# Patient Record
Sex: MALE | Race: OTHER | HISPANIC OR LATINO | URBAN - METROPOLITAN AREA
[De-identification: names, ages, dates, MRNs, and addresses within clinical notes are randomized per-mention and may not be internally consistent; named-entity substitution may affect disease eponyms.]

---

## 2019-08-06 ENCOUNTER — INPATIENT (INPATIENT)
Facility: HOSPITAL | Age: 54
LOS: 1 days | Discharge: HOME | DRG: 312 | End: 2019-08-08
Attending: INTERNAL MEDICINE | Admitting: INTERNAL MEDICINE
Payer: COMMERCIAL

## 2019-08-06 VITALS
RESPIRATION RATE: 18 BRPM | DIASTOLIC BLOOD PRESSURE: 77 MMHG | SYSTOLIC BLOOD PRESSURE: 121 MMHG | HEIGHT: 69 IN | TEMPERATURE: 98 F | WEIGHT: 210.1 LBS | HEART RATE: 70 BPM | OXYGEN SATURATION: 98 %

## 2019-08-06 DIAGNOSIS — R55 SYNCOPE AND COLLAPSE: ICD-10-CM

## 2019-08-06 DIAGNOSIS — Z29.9 ENCOUNTER FOR PROPHYLACTIC MEASURES, UNSPECIFIED: ICD-10-CM

## 2019-08-06 LAB
ALBUMIN SERPL ELPH-MCNC: 3.6 G/DL — SIGNIFICANT CHANGE UP (ref 3.5–5)
ALP SERPL-CCNC: 65 U/L — SIGNIFICANT CHANGE UP (ref 40–120)
ALT FLD-CCNC: 47 U/L DA — SIGNIFICANT CHANGE UP (ref 10–60)
ANION GAP SERPL CALC-SCNC: 7 MMOL/L — SIGNIFICANT CHANGE UP (ref 5–17)
APTT BLD: 27.1 SEC — LOW (ref 27.5–36.3)
AST SERPL-CCNC: 34 U/L — SIGNIFICANT CHANGE UP (ref 10–40)
BASOPHILS # BLD AUTO: 0.06 K/UL — SIGNIFICANT CHANGE UP (ref 0–0.2)
BASOPHILS NFR BLD AUTO: 0.6 % — SIGNIFICANT CHANGE UP (ref 0–2)
BILIRUB SERPL-MCNC: 1.2 MG/DL — SIGNIFICANT CHANGE UP (ref 0.2–1.2)
BUN SERPL-MCNC: 12 MG/DL — SIGNIFICANT CHANGE UP (ref 7–18)
CALCIUM SERPL-MCNC: 8.9 MG/DL — SIGNIFICANT CHANGE UP (ref 8.4–10.5)
CHLORIDE SERPL-SCNC: 100 MMOL/L — SIGNIFICANT CHANGE UP (ref 96–108)
CK MB BLD-MCNC: 0.8 % — SIGNIFICANT CHANGE UP (ref 0–3.5)
CK MB BLD-MCNC: 0.8 % — SIGNIFICANT CHANGE UP (ref 0–3.5)
CK MB CFR SERPL CALC: 1.1 NG/ML — SIGNIFICANT CHANGE UP (ref 0–3.6)
CK MB CFR SERPL CALC: 1.3 NG/ML — SIGNIFICANT CHANGE UP (ref 0–3.6)
CK MB CFR SERPL CALC: 1.7 NG/ML — SIGNIFICANT CHANGE UP (ref 0–3.6)
CK SERPL-CCNC: 142 U/L — SIGNIFICANT CHANGE UP (ref 35–232)
CK SERPL-CCNC: 172 U/L — SIGNIFICANT CHANGE UP (ref 35–232)
CO2 SERPL-SCNC: 29 MMOL/L — SIGNIFICANT CHANGE UP (ref 22–31)
CREAT SERPL-MCNC: 1.16 MG/DL — SIGNIFICANT CHANGE UP (ref 0.5–1.3)
D DIMER BLD IA.RAPID-MCNC: <150 NG/ML DDU — SIGNIFICANT CHANGE UP
EOSINOPHIL # BLD AUTO: 0.04 K/UL — SIGNIFICANT CHANGE UP (ref 0–0.5)
EOSINOPHIL NFR BLD AUTO: 0.4 % — SIGNIFICANT CHANGE UP (ref 0–6)
GLUCOSE SERPL-MCNC: 101 MG/DL — HIGH (ref 70–99)
HCT VFR BLD CALC: 46 % — SIGNIFICANT CHANGE UP (ref 39–50)
HGB BLD-MCNC: 15 G/DL — SIGNIFICANT CHANGE UP (ref 13–17)
IMM GRANULOCYTES NFR BLD AUTO: 0.2 % — SIGNIFICANT CHANGE UP (ref 0–1.5)
INR BLD: 0.99 RATIO — SIGNIFICANT CHANGE UP (ref 0.88–1.16)
LYMPHOCYTES # BLD AUTO: 1.58 K/UL — SIGNIFICANT CHANGE UP (ref 1–3.3)
LYMPHOCYTES # BLD AUTO: 16.2 % — SIGNIFICANT CHANGE UP (ref 13–44)
MAGNESIUM SERPL-MCNC: 2.3 MG/DL — SIGNIFICANT CHANGE UP (ref 1.6–2.6)
MCHC RBC-ENTMCNC: 29 PG — SIGNIFICANT CHANGE UP (ref 27–34)
MCHC RBC-ENTMCNC: 32.6 GM/DL — SIGNIFICANT CHANGE UP (ref 32–36)
MCV RBC AUTO: 89 FL — SIGNIFICANT CHANGE UP (ref 80–100)
MONOCYTES # BLD AUTO: 0.89 K/UL — SIGNIFICANT CHANGE UP (ref 0–0.9)
MONOCYTES NFR BLD AUTO: 9.1 % — SIGNIFICANT CHANGE UP (ref 2–14)
NEUTROPHILS # BLD AUTO: 7.18 K/UL — SIGNIFICANT CHANGE UP (ref 1.8–7.4)
NEUTROPHILS NFR BLD AUTO: 73.5 % — SIGNIFICANT CHANGE UP (ref 43–77)
NRBC # BLD: 0 /100 WBCS — SIGNIFICANT CHANGE UP (ref 0–0)
NT-PROBNP SERPL-SCNC: 286 PG/ML — HIGH (ref 0–125)
PLATELET # BLD AUTO: 219 K/UL — SIGNIFICANT CHANGE UP (ref 150–400)
POTASSIUM SERPL-MCNC: 3.8 MMOL/L — SIGNIFICANT CHANGE UP (ref 3.5–5.3)
POTASSIUM SERPL-SCNC: 3.8 MMOL/L — SIGNIFICANT CHANGE UP (ref 3.5–5.3)
PROT SERPL-MCNC: 7.3 G/DL — SIGNIFICANT CHANGE UP (ref 6–8.3)
PROTHROM AB SERPL-ACNC: 11 SEC — SIGNIFICANT CHANGE UP (ref 10–12.9)
RBC # BLD: 5.17 M/UL — SIGNIFICANT CHANGE UP (ref 4.2–5.8)
RBC # FLD: 13 % — SIGNIFICANT CHANGE UP (ref 10.3–14.5)
SODIUM SERPL-SCNC: 136 MMOL/L — SIGNIFICANT CHANGE UP (ref 135–145)
TROPONIN I SERPL-MCNC: <0.015 NG/ML — SIGNIFICANT CHANGE UP (ref 0–0.04)
TSH SERPL-MCNC: 1.17 UU/ML — SIGNIFICANT CHANGE UP (ref 0.34–4.82)
WBC # BLD: 9.77 K/UL — SIGNIFICANT CHANGE UP (ref 3.8–10.5)
WBC # FLD AUTO: 9.77 K/UL — SIGNIFICANT CHANGE UP (ref 3.8–10.5)

## 2019-08-06 PROCEDURE — 99285 EMERGENCY DEPT VISIT HI MDM: CPT

## 2019-08-06 PROCEDURE — 71045 X-RAY EXAM CHEST 1 VIEW: CPT | Mod: 26

## 2019-08-06 RX ORDER — ASPIRIN/CALCIUM CARB/MAGNESIUM 324 MG
81 TABLET ORAL DAILY
Refills: 0 | Status: DISCONTINUED | OUTPATIENT
Start: 2019-08-06 | End: 2019-08-08

## 2019-08-06 RX ORDER — ATORVASTATIN CALCIUM 80 MG/1
40 TABLET, FILM COATED ORAL AT BEDTIME
Refills: 0 | Status: DISCONTINUED | OUTPATIENT
Start: 2019-08-06 | End: 2019-08-08

## 2019-08-06 RX ORDER — ENOXAPARIN SODIUM 100 MG/ML
40 INJECTION SUBCUTANEOUS DAILY
Refills: 0 | Status: DISCONTINUED | OUTPATIENT
Start: 2019-08-06 | End: 2019-08-08

## 2019-08-06 RX ORDER — METOPROLOL TARTRATE 50 MG
12.5 TABLET ORAL
Refills: 0 | Status: DISCONTINUED | OUTPATIENT
Start: 2019-08-06 | End: 2019-08-08

## 2019-08-06 RX ADMIN — ATORVASTATIN CALCIUM 40 MILLIGRAM(S): 80 TABLET, FILM COATED ORAL at 21:39

## 2019-08-06 RX ADMIN — Medication 81 MILLIGRAM(S): at 11:45

## 2019-08-06 RX ADMIN — ENOXAPARIN SODIUM 40 MILLIGRAM(S): 100 INJECTION SUBCUTANEOUS at 11:45

## 2019-08-06 RX ADMIN — Medication 12.5 MILLIGRAM(S): at 18:22

## 2019-08-06 NOTE — ED PROVIDER NOTE - PHYSICAL EXAMINATION
Gen: Alert, NAD  Head: NC, AT   Eyes: PERRL, EOMI, normal lids/conjunctiva  ENT: normal hearing, patent oropharynx without erythema/exudate, uvula midline  Neck: supple, no tenderness, Trachea midline  Pulm: Bilateral BS, normal resp effort, no wheeze/stridor/retractions  CV: RRR, no M/R/G, 2+ radial and dp pulses bl, no edema  Abd: soft, NT/ND, +BS, no hepatosplenomegaly  Mskel: extremities x4 with normal ROM and no joint effusions. no ctl spine ttp.   Skin: minimally diaphoretic   Neuro: AAOx3, no sensory/motor deficits, CN 2-12 intact

## 2019-08-06 NOTE — H&P ADULT - PROBLEM SELECTOR PLAN 2
IMPROVE VTE Individual Risk Assessment  RISK                                                          Points  [] Previous VTE                                           3  [] Thrombophilia                                        2  [] Lower limb paralysis                              2   [] Current Cancer                                       2   [] Immobilization > 24 hrs                        1  [] ICU/CCU stay > 24 hours                       1  [] Age > 60                                                   1  IMPROVE VTE Score =   X for DVT chemoprophylaxis  X for GI chemoprophylaxis IMPROVE VTE Individual Risk Assessment  RISK                                                          Points  [] Previous VTE                                           3  [] Thrombophilia                                        2  [] Lower limb paralysis                              2   [] Current Cancer                                       2   [] Immobilization > 24 hrs                        1  [] ICU/CCU stay > 24 hours                       1  [] Age > 60                                                   1  IMPROVE VTE Score = 1  lovenox 40 mg for DVT chemoprophylaxis

## 2019-08-06 NOTE — ED ADULT NURSE NOTE - NSIMPLEMENTINTERV_GEN_ALL_ED
Implemented All Universal Safety Interventions:  [place] to call system. Call bell, personal items and telephone within reach. Instruct patient to call for assistance. Room bathroom lighting operational. Non-slip footwear when patient is off stretcher. Physically safe environment: no spills, clutter or unnecessary equipment. Stretcher in lowest position, wheels locked, appropriate side rails in place.

## 2019-08-06 NOTE — CONSULT NOTE ADULT - ASSESSMENT
53 years old male with no formal med hx, active smoker, presented with near syncope.  1.Tele monitoring.  2.Echocardiogram.  3.Orthostatic BP q shift.  4.Smoking cessation.  5.Cont asa,statin,b blocker.  6.GI and DVT prophylaxis.

## 2019-08-06 NOTE — ED PROVIDER NOTE - CLINICAL SUMMARY MEDICAL DECISION MAKING FREE TEXT BOX
pt pw syncope. suspect pt has vasovagal episode. however, he is being worked up as an outpatient for myocardial ischemia and was meant to have a test tomorrow. favor admission to expedite work up. I read ekg as sinus ryhthm rate 68 with first degree av block, no pathologic st elevation or depression, no t inversions, q waves inferior, normal qtc and axis.

## 2019-08-06 NOTE — ED PROVIDER NOTE - OBJECTIVE STATEMENT
Pertinent PMH/PSH/FHx/SHx and Review of Systems contained within:  53m no formal med hx, smoker, pw cp and syncope. patient notes that this morning while working on a [employer], he suddenly started feeling dizzy, sweaty, lightheaded, a little short of breath and left sided non rad cp. he had a syncopal vs near syncopal episode. the symptoms resolved on their own. ems called to scene and found him with normal vitals and alert and oriented x3. he was transported to the er. patient presently notes some cp in the left chest but it is not intense. dizziness has resolved. ROS neg for ha, vision loss, rhinorrhea, dysuria, numbness, rash, bleeding, fever, vomiting.

## 2019-08-06 NOTE — H&P ADULT - HISTORY OF PRESENT ILLNESS
53 years old male with no formal med hx, active smoker,, presented with near syncope. Patient notes that this morning while working, he suddenly started feeling dizzy, sweaty, lightheaded, a little short of breath and left sided non radiating chest pain. he had a syncopal vs near syncopal episode which he describes as he was too weak to stand and he slowly lied down to ground and he felt like his blood pressure is low . Patient's symptoms resolved on their own. EMS was called to scene and found him with normal vitals and alert and oriented x3. he was transported to the er. patient presently notes some chest pain in the left chest but it is mild now. pt symptom of dizziness has resolved. patient has an outpatient cardiologist in [place] and was scheduled to have nuclear scan of heart tomorrow. patient has 1 similar episode of syncope before as well. No family history of cardiac disease in family.  patient denies any shortness of breath, palpitations fever, cough, vision loss, rhinorrhea, dysuria, numbness, rash, bleeding, fever, vomiting, change in bowel and urinary habits.

## 2019-08-06 NOTE — H&P ADULT - NSHPPHYSICALEXAM_GEN_ALL_CORE
ICU Vital Signs Last 24 Hrs  T(C): 36.8 (06 Aug 2019 08:45), Max: 36.8 (06 Aug 2019 08:45)  T(F): 98.2 (06 Aug 2019 08:45), Max: 98.2 (06 Aug 2019 08:45)  HR: 70 (06 Aug 2019 08:45) (70 - 70)  BP: 121/77 (06 Aug 2019 08:45) (121/77 - 121/77)  BP(mean): --  ABP: --  ABP(mean): --  RR: 18 (06 Aug 2019 08:45) (18 - 18)  SpO2: 98% (06 Aug 2019 08:45) (98% - 98%)

## 2019-08-06 NOTE — H&P ADULT - ASSESSMENT
53 years old male with no formal med hx, active smoker,, presented with near syncope. Patient notes that this morning while working, he suddenly started feeling dizzy, sweaty, lightheaded, a little short of breath and left sided non radiating chest pain. he had a syncopal vs near syncopal episode which he describes as he was too weak to stand and he slowly lied down to ground and he felt like his blood pressure is low . Patient's symptoms resolved on their own. EMS was called to scene and found him with normal vitals and alert and oriented x3. he was transported to the er. patient presently notes some chest pain in the left chest but it is mild now. pt symptom of dizziness has resolved. patient has an outpatient cardiologist in [place] and was scheduled to have nuclear scan of heart tomorrow. patient has 1 similar episode of syncope before as well. No family history of cardiac disease in family.  patient denies any shortness of breath, palpitations fever, cough, vision loss, rhinorrhea, dysuria, numbness, rash, bleeding, fever, vomiting, change in bowel and urinary habits.  patient is admitted to rule out ACS.

## 2019-08-06 NOTE — CONSULT NOTE ADULT - SUBJECTIVE AND OBJECTIVE BOX
CHIEF COMPLAINT:Patient is a 53y old  Male who presents with a chief complaint of near syncope.       HPI:  53 years old male with no formal med hx, active smoker,, presented with near syncope. Patient notes that this morning while working, he suddenly started feeling dizzy, sweaty, lightheaded, a little short of breath and left sided non radiating chest pain. he had a syncopal vs near syncopal episode which he describes as he was too weak to stand and he slowly lied down to ground and he felt like his blood pressure is low . Patient's symptoms resolved on their own. EMS was called to scene and found him with normal vitals and alert and oriented x3. he was transported to the er. patient presently notes some chest pain in the left chest but it is mild now. pt symptom of dizziness has resolved. patient has an outpatient cardiologist in [place] and was scheduled to have nuclear scan of heart tomorrow. patient has 1 similar episode of syncope before as well. No family history of cardiac disease in family.Patient denies any shortness of breath, palpitations fever, cough, vision loss, rhinorrhea, dysuria, numbness, rash, bleeding, fever, vomiting, change in bowel and urinary habits. (06 Aug 2019 11:04)      PAST MEDICAL & SURGICAL HISTORY:  No pertinent past medical history  No significant past surgical history      MEDICATIONS  (STANDING):  aspirin  chewable 81 milliGRAM(s) Oral daily  atorvastatin 40 milliGRAM(s) Oral at bedtime  enoxaparin Injectable 40 milliGRAM(s) SubCutaneous daily  metoprolol tartrate 12.5 milliGRAM(s) Oral two times a day        FAMILY HISTORY: No hx of CAD      SOCIAL HISTORY:    [ x] Non-smoker    [x ] Alcohol-denies    Allergies    No Known Allergies    Intolerances    	    REVIEW OF SYSTEMS:  CONSTITUTIONAL: No fever, weight loss, or fatigue  EYES: No eye pain, visual disturbances, or discharge  ENT:  No difficulty hearing, tinnitus, vertigo; No sinus or throat pain  NECK: No pain or stiffness  RESPIRATORY: No cough, wheezing, chills or hemoptysis; No Shortness of Breath  CARDIOVASCULAR: No chest pain, palpitations, + passing out   GASTROINTESTINAL: No abdominal or epigastric pain. No nausea, vomiting, or hematemesis; No diarrhea or constipation. No melena or hematochezia.  GENITOURINARY: No dysuria, frequency, hematuria, or incontinence  NEUROLOGICAL: No headaches, memory loss, loss of strength, numbness, or tremors  SKIN: No itching, burning, rashes, or lesions   LYMPH Nodes: No enlarged glands  ENDOCRINE: No heat or cold intolerance; No hair loss  MUSCULOSKELETAL: No joint pain or swelling; No muscle, back, or extremity pain  PSYCHIATRIC: No depression, anxiety, mood swings, or difficulty sleeping  HEME/LYMPH: No easy bruising, or bleeding gums  ALLERGY AND IMMUNOLOGIC: No hives or eczema	      PHYSICAL EXAM:  T(C): 36.8 (08-06-19 @ 08:45), Max: 36.8 (08-06-19 @ 08:45)  HR: 70 (08-06-19 @ 08:45) (70 - 70)  BP: 121/77 (08-06-19 @ 08:45) (121/77 - 121/77)  RR: 18 (08-06-19 @ 08:45) (18 - 18)  SpO2: 98% (08-06-19 @ 08:45) (98% - 98%)  Wt(kg): --  I&O's Summary      Appearance: Normal	  HEENT:   Normal oral mucosa, PERRL, EOMI	  Lymphatic: No lymphadenopathy  Cardiovascular: Normal S1 S2, No JVD, No murmurs, No edema  Respiratory: Lungs clear to auscultation	  Psychiatry: A & O x 3, Mood & affect appropriate  Gastrointestinal:  Soft, Non-tender, + BS	  Skin: No rashes, No ecchymoses, No cyanosis	  Neurologic: Non-focal  Extremities: Normal range of motion, No clubbing, cyanosis or edema  Vascular: Peripheral pulses palpable 2+ bilaterally    	  LABS:	 	  CARDIAC MARKERS ( 06 Aug 2019 09:13 )  <0.015 ng/mL / x     / x     / x     / 1.7 ng/mL                              15.0   9.77  )-----------( 219      ( 06 Aug 2019 09:13 )             46.0     08-06    136  |  100  |  12  ----------------------------<  101<H>  3.8   |  29  |  1.16    Ca    8.9      06 Aug 2019 09:13  Mg     2.3     08-06    TPro  7.3  /  Alb  3.6  /  TBili  1.2  /  DBili  x   /  AST  34  /  ALT  47  /  AlkPhos  65  08-06    proBNP: Serum Pro-Brain Natriuretic Peptide: 286 pg/mL (08-06 @ 09:13)    TSH: Thyroid Stimulating Hormone, Serum: 1.17 uU/mL (08-06 @ 09:13)        EXAM:  XR CHEST PORTABLE URGENT 1V                            PROCEDURE DATE:  08/06/2019          INTERPRETATION:  Portable chest x-ray    Indication: Chest pain    Portable chest x-ray is acquired without a previous examination for   comparison.    Impression: No evidence for pulmonary consolidation, pleural effusion or   pneumothorax.    The trachea is midline.    The cardiac silhouette is magnified by AP technique.

## 2019-08-06 NOTE — H&P ADULT - PROBLEM SELECTOR PLAN 1
Pt presented with syncope and CP.  No cardiac disease risk factors except active smoking.  Admitted to rule out ACS.  Patient was scheduled to have stress test tomorrow with cardiologist.  EKG shows no ischemic changes.  Trending cardiac troponin, T1 is negative, F/U T2,T3.  Cardiology consult. Dr Orellana.  Admitted to telemetry.  f/u Echo.  f/u Stress test.

## 2019-08-07 ENCOUNTER — TRANSCRIPTION ENCOUNTER (OUTPATIENT)
Age: 54
End: 2019-08-07

## 2019-08-07 LAB
ALBUMIN SERPL ELPH-MCNC: 3.3 G/DL — LOW (ref 3.5–5)
ALP SERPL-CCNC: 57 U/L — SIGNIFICANT CHANGE UP (ref 40–120)
ALT FLD-CCNC: 40 U/L DA — SIGNIFICANT CHANGE UP (ref 10–60)
ANION GAP SERPL CALC-SCNC: 8 MMOL/L — SIGNIFICANT CHANGE UP (ref 5–17)
AST SERPL-CCNC: 26 U/L — SIGNIFICANT CHANGE UP (ref 10–40)
BILIRUB DIRECT SERPL-MCNC: 0.2 MG/DL — SIGNIFICANT CHANGE UP (ref 0–0.2)
BILIRUB INDIRECT FLD-MCNC: 0.7 MG/DL — SIGNIFICANT CHANGE UP (ref 0.2–1)
BILIRUB SERPL-MCNC: 0.9 MG/DL — SIGNIFICANT CHANGE UP (ref 0.2–1.2)
BUN SERPL-MCNC: 12 MG/DL — SIGNIFICANT CHANGE UP (ref 7–18)
CALCIUM SERPL-MCNC: 8.7 MG/DL — SIGNIFICANT CHANGE UP (ref 8.4–10.5)
CHLORIDE SERPL-SCNC: 103 MMOL/L — SIGNIFICANT CHANGE UP (ref 96–108)
CHOLEST SERPL-MCNC: 151 MG/DL — SIGNIFICANT CHANGE UP (ref 10–199)
CO2 SERPL-SCNC: 28 MMOL/L — SIGNIFICANT CHANGE UP (ref 22–31)
CREAT SERPL-MCNC: 0.93 MG/DL — SIGNIFICANT CHANGE UP (ref 0.5–1.3)
GLUCOSE BLDC GLUCOMTR-MCNC: 174 MG/DL — HIGH (ref 70–99)
GLUCOSE SERPL-MCNC: 107 MG/DL — HIGH (ref 70–99)
HBA1C BLD-MCNC: 6 % — HIGH (ref 4–5.6)
HCT VFR BLD CALC: 40.7 % — SIGNIFICANT CHANGE UP (ref 39–50)
HCV AB S/CO SERPL IA: 0.09 S/CO — SIGNIFICANT CHANGE UP (ref 0–0.99)
HCV AB SERPL-IMP: SIGNIFICANT CHANGE UP
HDLC SERPL-MCNC: 40 MG/DL — SIGNIFICANT CHANGE UP
HGB BLD-MCNC: 13.4 G/DL — SIGNIFICANT CHANGE UP (ref 13–17)
LIPID PNL WITH DIRECT LDL SERPL: 49 MG/DL — SIGNIFICANT CHANGE UP
MAGNESIUM SERPL-MCNC: 2.3 MG/DL — SIGNIFICANT CHANGE UP (ref 1.6–2.6)
MCHC RBC-ENTMCNC: 29.1 PG — SIGNIFICANT CHANGE UP (ref 27–34)
MCHC RBC-ENTMCNC: 32.9 GM/DL — SIGNIFICANT CHANGE UP (ref 32–36)
MCV RBC AUTO: 88.3 FL — SIGNIFICANT CHANGE UP (ref 80–100)
NRBC # BLD: 0 /100 WBCS — SIGNIFICANT CHANGE UP (ref 0–0)
PHOSPHATE SERPL-MCNC: 2.7 MG/DL — SIGNIFICANT CHANGE UP (ref 2.5–4.5)
PLATELET # BLD AUTO: 169 K/UL — SIGNIFICANT CHANGE UP (ref 150–400)
POTASSIUM SERPL-MCNC: 3.8 MMOL/L — SIGNIFICANT CHANGE UP (ref 3.5–5.3)
POTASSIUM SERPL-SCNC: 3.8 MMOL/L — SIGNIFICANT CHANGE UP (ref 3.5–5.3)
PROT SERPL-MCNC: 6.6 G/DL — SIGNIFICANT CHANGE UP (ref 6–8.3)
RBC # BLD: 4.61 M/UL — SIGNIFICANT CHANGE UP (ref 4.2–5.8)
RBC # FLD: 12.8 % — SIGNIFICANT CHANGE UP (ref 10.3–14.5)
SODIUM SERPL-SCNC: 139 MMOL/L — SIGNIFICANT CHANGE UP (ref 135–145)
TOTAL CHOLESTEROL/HDL RATIO MEASUREMENT: 3.8 RATIO — SIGNIFICANT CHANGE UP (ref 3.4–9.6)
TRIGL SERPL-MCNC: 310 MG/DL — HIGH (ref 10–149)
TSH SERPL-MCNC: 0.91 UU/ML — SIGNIFICANT CHANGE UP (ref 0.34–4.82)
VIT B12 SERPL-MCNC: 496 PG/ML — SIGNIFICANT CHANGE UP (ref 232–1245)
WBC # BLD: 7.19 K/UL — SIGNIFICANT CHANGE UP (ref 3.8–10.5)
WBC # FLD AUTO: 7.19 K/UL — SIGNIFICANT CHANGE UP (ref 3.8–10.5)

## 2019-08-07 PROCEDURE — 70450 CT HEAD/BRAIN W/O DYE: CPT | Mod: 26

## 2019-08-07 PROCEDURE — 93880 EXTRACRANIAL BILAT STUDY: CPT | Mod: 26

## 2019-08-07 PROCEDURE — 99223 1ST HOSP IP/OBS HIGH 75: CPT

## 2019-08-07 RX ADMIN — Medication 81 MILLIGRAM(S): at 11:46

## 2019-08-07 RX ADMIN — ATORVASTATIN CALCIUM 40 MILLIGRAM(S): 80 TABLET, FILM COATED ORAL at 22:30

## 2019-08-07 RX ADMIN — Medication 12.5 MILLIGRAM(S): at 18:09

## 2019-08-07 RX ADMIN — ENOXAPARIN SODIUM 40 MILLIGRAM(S): 100 INJECTION SUBCUTANEOUS at 11:47

## 2019-08-07 NOTE — PROGRESS NOTE ADULT - PROBLEM SELECTOR PLAN 1
Pt presented with syncope and CP.  No cardiac disease risk factors except active smoking.   stress test tomorrow with cardiologist.  EKG shows no ischemic changes.  Trending cardiac troponin, T1 is negative, F/U T2,T3.  Cardiology consult. Dr Orellana.  Admitted to telemetry.  f/u Echo.  f/u Stress test. Pt presented with syncope and CP.  No cardiac disease risk factors except active smoking.   stress test tomorrow with cardiologist.  EKG shows no ischemic changes.  Trending cardiac troponin, T1 is negative, F/U T2,T3.  Cardiology consult. Dr Orellana.  Admitted to telemetry.  Echo - G1DD  f/u Stress test - Will DC tele if negative.   Neuro consult Dr. Lambert  f/u carotid duplex, CT Head

## 2019-08-07 NOTE — PROGRESS NOTE ADULT - SUBJECTIVE AND OBJECTIVE BOX
PGY1 Note discussed with supervising resident and primary attending.    Patient is a 53y old  Male who presents with a chief complaint of near syncope. (07 Aug 2019 09:50)      INTERVAL HPI/OVERNIGHT EVENTS:  Pt reports resolution of sx this AM.   He is feeling well with no acute events o/n  Stress test this AM    MEDICATIONS  (STANDING):  aspirin  chewable 81 milliGRAM(s) Oral daily  atorvastatin 40 milliGRAM(s) Oral at bedtime  enoxaparin Injectable 40 milliGRAM(s) SubCutaneous daily  metoprolol tartrate 12.5 milliGRAM(s) Oral two times a day    MEDICATIONS  (PRN):      Allergies    No Known Allergies    Intolerances        REVIEW OF SYSTEMS:  CONSTITUTIONAL: No fever, weight loss, or fatigue  RESPIRATORY: No cough, wheezing, chills or hemoptysis; No shortness of breath  CARDIOVASCULAR: No chest pain, palpitations, dizziness, or leg swelling  GASTROINTESTINAL: No abdominal or epigastric pain. No nausea, vomiting, or hematemesis; No diarrhea or constipation. No melena or hematochezia.  NEUROLOGICAL: No headaches, memory loss, loss of strength, numbness, or tremors  SKIN: No itching, burning, rashes, or lesions     Vital Signs Last 24 Hrs  T(C): 36.6 (07 Aug 2019 05:01), Max: 36.8 (06 Aug 2019 14:24)  T(F): 97.8 (07 Aug 2019 05:01), Max: 98.3 (06 Aug 2019 14:24)  HR: 62 (07 Aug 2019 05:01) (62 - 77)  BP: 127/80 (07 Aug 2019 05:01) (109/66 - 128/75)  BP(mean): --  RR: 17 (07 Aug 2019 05:01) (17 - 18)  SpO2: 98% (07 Aug 2019 05:01) (97% - 100%)    PHYSICAL EXAM:  GENERAL: NAD, well-groomed, well-developed  HEAD:  Atraumatic, Normocephalic  EYES: EOMI, PERRLA, conjunctiva and sclera clear  NECK: Supple, No JVD, Normal thyroid  CHEST/LUNG: Clear to percussion bilaterally; No rales, rhonchi, wheezing, or rubs  HEART: Regular rate and rhythm; No murmurs, rubs, or gallops  ABDOMEN: Soft, Nontender, Nondistended; Bowel sounds present  NERVOUS SYSTEM:  Alert & Oriented X3, Good concentration; Motor Strength 5/5 B/L   EXTREMITIES:  2+ Peripheral Pulses, No clubbing, cyanosis, or edema  SKIN;    LABS:                                   13.4   7.19  )-----------( 169      ( 07 Aug 2019 07:05 )             40.7       08-07    139  |  103  |  12  ----------------------------<  107<H>  3.8   |  28  |  0.93    Ca    8.7      07 Aug 2019 07:05  Phos  2.7     08-07  Mg     2.3     08-07    TPro  6.6  /  Alb  3.3<L>  /  TBili  0.9  /  DBili  0.2  /  AST  26  /  ALT  40  /  AlkPhos  57  08-07    PT/INR - ( 06 Aug 2019 09:13 )   PT: 11.0 sec;   INR: 0.99 ratio         PTT - ( 06 Aug 2019 09:13 )  PTT:27.1 sec    CARDIAC MARKERS ( 06 Aug 2019 21:30 )  <0.015 ng/mL / x     / 142 U/L / x     / 1.1 ng/mL  CARDIAC MARKERS ( 06 Aug 2019 16:08 )  <0.015 ng/mL / x     / 172 U/L / x     / 1.3 ng/mL  CARDIAC MARKERS ( 06 Aug 2019 09:13 )  <0.015 ng/mL / x     / x     / x     / 1.7 ng/mL              RADIOLOGY & ADDITIONAL TESTS:  < from: Xray Chest 1 View- PORTABLE-Urgent (08.06.19 @ 09:30) >    EXAM:  XR CHEST PORTABLE URGENT 1V                            PROCEDURE DATE:  08/06/2019          INTERPRETATION:  Portable chest x-ray    Indication: Chest pain    Portable chest x-ray is acquired without a previous examination for   comparison.    Impression: No evidence for pulmonary consolidation, pleural effusion or   pneumothorax.    The trachea is midline.    The cardiac silhouette is magnified by AP technique.                MARC SIMPSON M.D., ATTENDING RADIOLOGIST  This document has been electronically signed. Aug  6 2019  9:34AM    < end of copied text >    < from: 12 Lead ECG (08.06.19 @ 08:51) >    Ventricular Rate 68 BPM    Atrial Rate 68 BPM    P-R Interval 216 ms    QRS Duration 86 ms    Q-T Interval 378 ms    QTC Calculation(Bezet) 401 ms    P Axis 35 degrees    R Axis -26 degrees    T Axis 36 degrees    Diagnosis Line Sinus rhythm with 1st degree A-V block  Inferior infarct , age undetermined  Abnormal ECG    Confirmed by LAW NELSON, [facility] ([id number]) on 8/6/2019 9:55:54 AM    < end of copied text >    Imaging Personally Reviewed:  [ ] YES  [ ] NO    Consultant(s) Notes Reviewed:  [ ] YES  [ ] NO

## 2019-08-07 NOTE — DISCHARGE NOTE PROVIDER - CARE PROVIDER_API CALL
Jesús Dye)  Internal Medicine  [address] [place] [address]  Phone: [phone]  Fax: [phone]  Follow Up Time: 1-3 days

## 2019-08-07 NOTE — CONSULT NOTE ADULT - SUBJECTIVE AND OBJECTIVE BOX
Patient is a 53y old  Male who presents with a chief complaint of near syncope. (07 Aug 2019 14:34)      HPI:  53 years old male with no formal med hx, active smoker,, presented with near syncope. Patient notes that this morning while working, he suddenly started feeling dizzy, sweaty, lightheaded, a little short of breath and left sided non radiating chest pain. he had a syncopal vs near syncopal episode which he describes as he was too weak to stand and he slowly lied down to ground and he felt like his blood pressure is low . Patient's symptoms resolved on their own. EMS was called to scene and found him with normal vitals and alert and oriented x3. he was transported to the er. patient presently notes some chest pain in the left chest but it is mild now. pt symptom of dizziness has resolved. patient has an outpatient cardiologist in [place] and was scheduled to have nuclear scan of heart tomorrow. patient has 1 similar episode of syncope before as well. No family history of cardiac disease in family.  patient denies any shortness of breath, palpitations fever, cough, vision loss, rhinorrhea, dysuria, numbness, rash, bleeding, fever, vomiting, change in bowel and urinary habits. (06 Aug 2019 11:04)         Neurological Review of Systems:  No difficulty with language.  No vision loss or double vision.  No dizziness, vertigo or new hearing loss.  No difficulty with speech or swallowing.  No focal weakness.  No focal sensory changes.  No numbness or tingling in the bilateral lower extremities.  No difficulty with balance.  No difficulty with ambulation.        MEDICATIONS  (STANDING):  aspirin  chewable 81 milliGRAM(s) Oral daily  atorvastatin 40 milliGRAM(s) Oral at bedtime  enoxaparin Injectable 40 milliGRAM(s) SubCutaneous daily  metoprolol tartrate 12.5 milliGRAM(s) Oral two times a day    MEDICATIONS  (PRN):    Allergies    No Known Allergies    Intolerances      PAST MEDICAL & SURGICAL HISTORY:  No pertinent past medical history  No significant past surgical history    FAMILY HISTORY:  No pertinent family history in first degree relatives    SOCIAL HISTORY: non smoker/ former smoker/ active smoker    Review of Systems:  Constitutional: No generalized weakness. No fevers or chills.                    Eyes, Ears, Mouth, Throat: No vision loss   Respiratory: No shortness of breath or cough.                                Cardiovascular: No chest pain or palpitations  Gastrointestinal: No nausea or vomiting.                                         Genitourinary: No urinary incontinence or burning on urination.  Musculoskeletal: No joint pain.                                                           Dermatologic: No rash.  Neurological: as per HPI                                                                      Psychiatric: No behavioral problems.  Endocrine: No known hypoglycemia.               Hematologic/Lymphatic: No easy bleeding.    O:  Vital Signs Last 24 Hrs  T(C): 36.5 (07 Aug 2019 14:34), Max: 36.6 (06 Aug 2019 17:22)  T(F): 97.7 (07 Aug 2019 14:34), Max: 97.9 (06 Aug 2019 21:12)  HR: 77 (07 Aug 2019 14:34) (62 - 77)  BP: 121/78 (07 Aug 2019 14:34) (113/76 - 128/75)  BP(mean): --  RR: 181 (07 Aug 2019 14:34) (17 - 181)  SpO2: 99% (07 Aug 2019 14:34) (97% - 99%)    General Exam:   General appearance: No acute distress                 Cardiovascular: Pedal dorsalis pulses intact bilaterally    Mental Status: Orientated to self, date and place.  Attention intact.  No dysarthria, aphasia or neglect.  Knowledge intact.  Registration intact.  Short and long term memory grossly intact.      Cranial Nerves: CN I - not tested.  PERRL, EOMI, VFF, no nystagmus or diplopia.  No APD.  Fundi not visualized.  CN V1-3 intact to light touch and pinprick.  No facial asymmetry.  Hearing intact to finger rub bilaterally.  Tongue, uvula and palate midline.  Sternocleidomastoid and Trapezius intact bilaterally.    Motor:   Tone: normal.                  Strength intact throughout  No pronator drift bilaterally                      No dysmetria on finger-nose-finger or heel-shin-heel  No truncal ataxia.  No resting, postural or action tremor.  No myoclonus.    Sensation: intact to light touch, pinprick, vibration and proprioception    Deep Tendon Reflexes: 1+ bilateral biceps, triceps, brachioradialis, knee and ankle  Toes flexor bilaterally    Gait: normal and stable.  Rhomberg -elieser.    Other:     LABS:                        13.4   7.19  )-----------( 169      ( 07 Aug 2019 07:05 )             40.7     08-07    139  |  103  |  12  ----------------------------<  107<H>  3.8   |  28  |  0.93    Ca    8.7      07 Aug 2019 07:05  Phos  2.7     08-07  Mg     2.3     08-07    TPro  6.6  /  Alb  3.3<L>  /  TBili  0.9  /  DBili  0.2  /  AST  26  /  ALT  40  /  AlkPhos  57  08-07    PT/INR - ( 06 Aug 2019 09:13 )   PT: 11.0 sec;   INR: 0.99 ratio         PTT - ( 06 Aug 2019 09:13 )  PTT:27.1 sec        RADIOLOGY & ADDITIONAL STUDIES:    < from: 12 Lead ECG (08.06.19 @ 08:51) >  Ventricular Rate 68 BPM    Atrial Rate 68 BPM    P-R Interval 216 ms    QRS Duration 86 ms    Q-T Interval 378 ms    QTC Calculation(Bezet) 401 ms    P Axis 35 degrees    R Axis -26 degrees    T Axis 36 degrees    Diagnosis Line Sinus rhythm with 1st degree A-V block  Inferior infarct , age undetermined  Abnormal ECG    Confirmed by LAW NELSON, [facility] ([id number]) on 8/6/2019 9:55:54 AM    < end of copied text >  < from: CT Head No Cont (08.07.19 @ 15:39) >    EXAM:  CT BRAIN                            PROCEDURE DATE:  08/07/2019          INTERPRETATION:  CLINICAL STATEMENT: Syncope    TECHNIQUE: CT of the head was performed without IV contrast.    COMPARISON: None.    FINDINGS:      There is no acute parenchymal hemorrhage, parenchymal mass, mass effect   or midline shift. There is no extra-axial fluid collection. There is no   acute territorial infarct. There is no hydrocephalus.    The cranium is intact.The visualized paranasal sinuses are well-aerated.    IMPRESSION:     No acute intracranial hemorrhage or acute territorial infarct.                LUIS GOLDEN M.D.,ATTENDING RADIOLOGIST  This document has been electronically signed. Aug  7 2019  3:45PM                < end of copied text > Patient is a 53y old  Male who presents with a chief complaint of near syncope. (07 Aug 2019 14:34)      HPI:  53 years old male with no formal med hx, active smoker,, presented with near syncope. Patient notes that this morning while working, he suddenly started feeling dizzy, sweaty, lightheaded, a little short of breath and left sided non radiating chest pain. he had a syncopal vs near syncopal episode which he describes as he was too weak to stand and he slowly lied down to ground and he felt like his blood pressure is low . Patient's symptoms resolved on their own. EMS was called to scene and found him with normal vitals and alert and oriented x3. he was transported to the er. patient presently notes some chest pain in the left chest but it is mild now. pt symptom of dizziness has resolved. patient has an outpatient cardiologist in [place] and was scheduled to have nuclear scan of heart tomorrow. patient has 1 similar episode of syncope before as well. No family history of cardiac disease in family.  patient denies any shortness of breath, palpitations fever, cough, vision loss, rhinorrhea, dysuria, numbness, rash, bleeding, fever, vomiting, change in bowel and urinary habits. (06 Aug 2019 11:04).  Pt reports yesterday while at work he felt his blood pressure drop, he started sweating, and he had a HA "in eyes."  Pt states that he laid down on the floor b/c he was "seeing black" and felt like he was going to faint.    Pt reports LOC x 15-20 min.  When he awoke he denies confusion, loss of urine or bowels.  Also, denies tongue bite.  However, reports 24yr history of LOC, 2x's/yr most recently 4 months ago.  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             Neurological Review of Systems:  No difficulty with language.  No vision loss or double vision.  No dizziness, vertigo or new hearing loss.  No difficulty with speech or swallowing.  No focal weakness.  No focal sensory changes.  No numbness or tingling in the bilateral lower extremities.  No difficulty with balance.  No difficulty with ambulation.        MEDICATIONS  (STANDING):  aspirin  chewable 81 milliGRAM(s) Oral daily  atorvastatin 40 milliGRAM(s) Oral at bedtime  enoxaparin Injectable 40 milliGRAM(s) SubCutaneous daily  metoprolol tartrate 12.5 milliGRAM(s) Oral two times a day    MEDICATIONS  (PRN):    Allergies    No Known Allergies    Intolerances      PAST MEDICAL & SURGICAL HISTORY:  No pertinent past medical history  No significant past surgical history    FAMILY HISTORY:  No pertinent family history in first degree relatives    SOCIAL HISTORY:  active smoker    Review of Systems:  Constitutional: No generalized weakness. No fevers or chills                    Eyes, Ears, Mouth, Throat: No vision loss.  (+) HA  Respiratory: No shortness of breath or cough                                Cardiovascular: No chest pain or palpitations  Gastrointestinal: No nausea or vomiting                                       Genitourinary: No urinary incontinence or burning on urination  Musculoskeletal: No joint pain                                                          Dermatologic: No rash  Neurological: as per HPI                                                                      Psychiatric: No behavioral problems  Endocrine: No known hypoglycemia               Hematologic/Lymphatic: No easy bleeding    O:  Vital Signs Last 24 Hrs  T(C): 36.5 (07 Aug 2019 14:34), Max: 36.6 (06 Aug 2019 17:22)  T(F): 97.7 (07 Aug 2019 14:34), Max: 97.9 (06 Aug 2019 21:12)  HR: 77 (07 Aug 2019 14:34) (62 - 77)  BP: 121/78 (07 Aug 2019 14:34) (113/76 - 128/75)  RR: 181 (07 Aug 2019 14:34) (17 - 181)  SpO2: 99% (07 Aug 2019 14:34) (97% - 99%)    General Exam:   General appearance: No acute distress                 Cardiovascular: Pedal dorsalis pulses intact bilaterally    Mental Status: Orientated to self, date and place.  Attention intact.  No dysarthria, aphasia or neglect.  Knowledge intact.  Registration intact.  Short and long term memory grossly intact.      Cranial Nerves: CN I - not tested.  PERRL, EOMI, VFF, no nystagmus or diplopia.  No APD.  Fundi not visualized.  CN V1-3 intact to light touch and pinprick.  No facial asymmetry.  Hearing intact to finger rub bilaterally.  Tongue, uvula and palate midline.  Sternocleidomastoid and Trapezius intact bilaterally.    Motor:   Tone: Normal.  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                    Strength intact 5/5 x 4 ext  No pronator drift bilaterally                      No dysmetria on finger-nose-finger or heel-shin-heel  No truncal ataxia.  No resting, postural or action tremor.  No myoclonus.    Sensation: intact to light touch and pinprick     Deep Tendon Reflexes: 1+ bilateral biceps, triceps, brachioradialis.  Mute knee and ankle  Toes flexor bilaterally    Gait: normal and stable.  Rhomberg -elieser.    Other:     LABS:                        13.4   7.19  )-----------( 169      ( 07 Aug 2019 07:05 )             40.7     08-07    139  |  103  |  12  ----------------------------<  107<H>  3.8   |  28  |  0.93    Ca    8.7      07 Aug 2019 07:05  Phos  2.7     08-07  Mg     2.3     08-07    TPro  6.6  /  Alb  3.3<L>  /  TBili  0.9  /  DBili  0.2  /  AST  26  /  ALT  40  /  AlkPhos  57  08-07    PT/INR - ( 06 Aug 2019 09:13 )   PT: 11.0 sec;   INR: 0.99 ratio         PTT - ( 06 Aug 2019 09:13 )  PTT:27.1 sec        RADIOLOGY & ADDITIONAL STUDIES:    < from: 12 Lead ECG (08.06.19 @ 08:51) >  Ventricular Rate 68 BPM    Atrial Rate 68 BPM    P-R Interval 216 ms    QRS Duration 86 ms    Q-T Interval 378 ms    QTC Calculation(Bezet) 401 ms    P Axis 35 degrees    R Axis -26 degrees    T Axis 36 degrees    Diagnosis Line Sinus rhythm with 1st degree A-V block  Inferior infarct , age undetermined  Abnormal ECG    Confirmed by LAW NELSON, [facility] ([id number]) on 8/6/2019 9:55:54 AM    < end of copied text >  < from: CT Head No Cont (08.07.19 @ 15:39) >    EXAM:  CT BRAIN                            PROCEDURE DATE:  08/07/2019          INTERPRETATION:  CLINICAL STATEMENT: Syncope    TECHNIQUE: CT of the head was performed without IV contrast.    COMPARISON: None.    FINDINGS:      There is no acute parenchymal hemorrhage, parenchymal mass, mass effect   or midline shift. There is no extra-axial fluid collection. There is no   acute territorial infarct. There is no hydrocephalus.    The cranium is intact.The visualized paranasal sinuses are well-aerated.    IMPRESSION:     No acute intracranial hemorrhage or acute territorial infarct.                LUIS GOLDEN M.D.,ATTENDING RADIOLOGIST  This document has been electronically signed. Aug  7 2019  3:45PM                < end of copied text > Patient is a 53y old  Male who presents with a chief complaint of near syncope. (07 Aug 2019 14:34)      HPI:  53 years old male with no formal med hx, active smoker,, presented with near syncope. Patient notes that this morning while working, he suddenly started feeling dizzy, sweaty, lightheaded, a little short of breath and left sided non radiating chest pain. he had a syncopal vs near syncopal episode which he describes as he was too weak to stand and he slowly lied down to ground and he felt like his blood pressure is low . Patient's symptoms resolved on their own. EMS was called to scene and found him with normal vitals and alert and oriented x3. he was transported to the er. patient presently notes some chest pain in the left chest but it is mild now. pt symptom of dizziness has resolved. patient has an outpatient cardiologist in [place] and was scheduled to have nuclear scan of heart tomorrow. patient has 1 similar episode of syncope before as well. No family history of cardiac disease in family.  patient denies any shortness of breath, palpitations fever, cough, vision loss, rhinorrhea, dysuria, numbness, rash, bleeding, fever, vomiting, change in bowel and urinary habits. (06 Aug 2019 11:04).  Pt reports yesterday while at work he felt his blood pressure drop, he started sweating, and he had a HA "in eyes."  Pt states that he laid down on the floor b/c he was "seeing black" and felt like he was going to faint.    Pt reports LOC x 15-20 min.  When he awoke he denies confusion, loss of urine or bowels.  Also, denies tongue bite.  However, reports 24yr history of LOC, 2x's/yr most recently 4 months ago.  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             Neurological Review of Systems:  No difficulty with language.  No vision loss or double vision.  No dizziness, vertigo or new hearing loss.  No difficulty with speech or swallowing.  No focal weakness.  No focal sensory changes.  No numbness or tingling in the bilateral lower extremities.  No difficulty with balance.  No difficulty with ambulation.        MEDICATIONS  (STANDING):  aspirin  chewable 81 milliGRAM(s) Oral daily  atorvastatin 40 milliGRAM(s) Oral at bedtime  enoxaparin Injectable 40 milliGRAM(s) SubCutaneous daily  metoprolol tartrate 12.5 milliGRAM(s) Oral two times a day    MEDICATIONS  (PRN):    Allergies    No Known Allergies    Intolerances      PAST MEDICAL & SURGICAL HISTORY:  No pertinent past medical history  No significant past surgical history    FAMILY HISTORY:  No pertinent family history in first degree relatives    SOCIAL HISTORY:  active smoker    Review of Systems:  Constitutional: No generalized weakness. No fevers or chills                    Eyes, Ears, Mouth, Throat: No vision loss.  (+) HA  Respiratory: No shortness of breath or cough                                Cardiovascular: No chest pain or palpitations  Gastrointestinal: No nausea or vomiting                                       Genitourinary: No urinary incontinence or burning on urination  Musculoskeletal: No joint pain                                                          Dermatologic: No rash  Neurological: as per HPI                                                                      Psychiatric: No behavioral problems  Endocrine: No known hypoglycemia               Hematologic/Lymphatic: No easy bleeding    O:  Vital Signs Last 24 Hrs  T(C): 36.5 (07 Aug 2019 14:34), Max: 36.6 (06 Aug 2019 17:22)  T(F): 97.7 (07 Aug 2019 14:34), Max: 97.9 (06 Aug 2019 21:12)  HR: 77 (07 Aug 2019 14:34) (62 - 77)  BP: 121/78 (07 Aug 2019 14:34) (113/76 - 128/75)  RR: 181 (07 Aug 2019 14:34) (17 - 181)  SpO2: 99% (07 Aug 2019 14:34) (97% - 99%)    General Exam:   General appearance: No acute distress                 Cardiovascular: Pedal dorsalis pulses intact bilaterally    Mental Status: Orientated to self, date and place.  Attention intact.  No dysarthria, aphasia or neglect.  Knowledge intact.  Registration intact.  Short and long term memory grossly intact.      Cranial Nerves: CN I - not tested.  PERRL, EOMI, VFF, no nystagmus or diplopia.  No APD.  Fundi not visualized.  CN V1-3 intact to light touch and pinprick.  No facial asymmetry.  Hearing intact to finger rub bilaterally.  Tongue, uvula and palate midline.  Sternocleidomastoid and Trapezius intact bilaterally.    Motor:   Tone: Normal.  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                    Strength intact 5/5 x 4 ext  No pronator drift bilaterally                      No dysmetria on finger-nose-finger or heel-shin-heel  No truncal ataxia.  No resting, postural or action tremor.  No myoclonus.    Sensation: intact to light touch and pinprick     Deep Tendon Reflexes: 2+ bilateral biceps, triceps, brachioradialis.  Mute knee and ankle  Toes flexor bilaterally    Gait: normal and stable.  Rhomberg -elieser.    Other:     LABS:                        13.4   7.19  )-----------( 169      ( 07 Aug 2019 07:05 )             40.7     08-07    139  |  103  |  12  ----------------------------<  107<H>  3.8   |  28  |  0.93    Ca    8.7      07 Aug 2019 07:05  Phos  2.7     08-07  Mg     2.3     08-07    TPro  6.6  /  Alb  3.3<L>  /  TBili  0.9  /  DBili  0.2  /  AST  26  /  ALT  40  /  AlkPhos  57  08-07    PT/INR - ( 06 Aug 2019 09:13 )   PT: 11.0 sec;   INR: 0.99 ratio         PTT - ( 06 Aug 2019 09:13 )  PTT:27.1 sec        RADIOLOGY & ADDITIONAL STUDIES:    < from: 12 Lead ECG (08.06.19 @ 08:51) >  Ventricular Rate 68 BPM    Atrial Rate 68 BPM    P-R Interval 216 ms    QRS Duration 86 ms    Q-T Interval 378 ms    QTC Calculation(Bezet) 401 ms    P Axis 35 degrees    R Axis -26 degrees    T Axis 36 degrees    Diagnosis Line Sinus rhythm with 1st degree A-V block  Inferior infarct , age undetermined  Abnormal ECG    Confirmed by LAW NELSON, [facility] ([id number]) on 8/6/2019 9:55:54 AM    < end of copied text >  < from: CT Head No Cont (08.07.19 @ 15:39) >    EXAM:  CT BRAIN                            PROCEDURE DATE:  08/07/2019          INTERPRETATION:  CLINICAL STATEMENT: Syncope    TECHNIQUE: CT of the head was performed without IV contrast.    COMPARISON: None.    FINDINGS:      There is no acute parenchymal hemorrhage, parenchymal mass, mass effect   or midline shift. There is no extra-axial fluid collection. There is no   acute territorial infarct. There is no hydrocephalus.    The cranium is intact.The visualized paranasal sinuses are well-aerated.    IMPRESSION:     No acute intracranial hemorrhage or acute territorial infarct.                LUIS GOLDEN M.D.,ATTENDING RADIOLOGIST  This document has been electronically signed. Aug  7 2019  3:45PM                < end of copied text > Patient is a 53y old  Male who presents with a chief complaint of near syncope. (07 Aug 2019 14:34)    HPI:  53 years old male with no formal med hx, active smoker,, presented with near syncope. Patient notes that this morning while working, he suddenly started feeling dizzy, sweaty, lightheaded, a little short of breath and left sided non radiating chest pain. he had a syncopal vs near syncopal episode which he describes as he was too weak to stand and he slowly lied down to ground and he felt like his blood pressure is low . Patient's symptoms resolved on their own. EMS was called to scene and found him with normal vitals and alert and oriented x3. he was transported to the er. patient presently notes some chest pain in the left chest but it is mild now. pt symptom of dizziness has resolved. patient has an outpatient cardiologist in [place] and was scheduled to have nuclear scan of heart tomorrow. patient has 1 similar episode of syncope before as well. No family history of cardiac disease in family.  patient denies any shortness of breath, palpitations fever, cough, vision loss, rhinorrhea, dysuria, numbness, rash, bleeding, fever, vomiting, change in bowel and urinary habits. (06 Aug 2019 11:04).  Pt reports yesterday while at work he felt his blood pressure drop, he started sweating, and he had a HA "in eyes."  Pt states that he laid down on the floor b/c he was "seeing black" and felt like he was going to faint.    Pt reports LOC x 15-20 min.  When he awoke he denies confusion, loss of urine or bowels.  Also, denies tongue bite.  However, reports 24yr history of LOC, 2x's/yr most recently 4 months ago.  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        Neurological Review of Systems:  No difficulty with language.  No vision loss or double vision.  No dizziness, vertigo or new hearing loss.  No difficulty with speech or swallowing.  No focal weakness.  No focal sensory changes.  No numbness or tingling in the bilateral lower extremities.  No difficulty with balance.  No difficulty with ambulation.      MEDICATIONS  (STANDING):  aspirin  chewable 81 milliGRAM(s) Oral daily  atorvastatin 40 milliGRAM(s) Oral at bedtime  enoxaparin Injectable 40 milliGRAM(s) SubCutaneous daily  metoprolol tartrate 12.5 milliGRAM(s) Oral two times a day    Allergies  No Known Allergies    PAST MEDICAL & SURGICAL HISTORY: None reported    FAMILY HISTORY: None reported    SOCIAL HISTORY:  active smoker    Review of Systems:  Constitutional: No generalized weakness. No fevers or chills                    Eyes, Ears, Mouth, Throat: No vision loss.  (+) HA  Respiratory: No shortness of breath or cough                                Cardiovascular: No chest pain or palpitations  Gastrointestinal: No nausea or vomiting                                       Genitourinary: No urinary incontinence or burning on urination  Musculoskeletal: No joint pain                                                          Dermatologic: No rash  Neurological: as per HPI                                                                      Psychiatric: No behavioral problems  Endocrine: No known hypoglycemia               Hematologic/Lymphatic: No easy bleeding      O:  Vital Signs Last 24 Hrs  T(C): 36.5 (07 Aug 2019 14:34), Max: 36.6 (06 Aug 2019 17:22)  T(F): 97.7 (07 Aug 2019 14:34), Max: 97.9 (06 Aug 2019 21:12)  HR: 77 (07 Aug 2019 14:34) (62 - 77)  BP: 121/78 (07 Aug 2019 14:34) (113/76 - 128/75)  RR: 181 (07 Aug 2019 14:34) (17 - 181)  SpO2: 99% (07 Aug 2019 14:34) (97% - 99%)    General Exam:   General appearance: No acute distress                 Cardiovascular: Pedal dorsalis pulses intact bilaterally    Mental Status: Orientated to self, date and place.  Attention intact.  No dysarthria, aphasia or neglect.  Knowledge intact.  Registration intact.  Short and long term memory grossly intact.      Cranial Nerves: CN I - not tested.  PERRL, EOMI, VFF, no nystagmus or diplopia.  No APD.  Fundi not visualized.  CN V1-3 intact to light touch and pinprick.  No facial asymmetry.  Hearing intact to finger rub bilaterally.  Tongue, uvula and palate midline.  Sternocleidomastoid and Trapezius intact bilaterally.    Motor:   Tone: Normal.  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                    Strength intact 5/5 x 4 ext  No pronator drift bilaterally                      No dysmetria on finger-nose-finger or heel-shin-heel  No truncal ataxia.  No resting, postural or action tremor.  No myoclonus.    Sensation: intact to light touch and pinprick     Deep Tendon Reflexes: 2+ bilateral biceps, triceps, brachioradialis.  Mute knee and ankle  Toes flexor bilaterally    Gait: normal and stable.  Rhomberg -elieser.      LABS:                        13.4   7.19  )-----------( 169      ( 07 Aug 2019 07:05 )             40.7     08-07    139  |  103  |  12  ----------------------------<  107<H>  3.8   |  28  |  0.93    Ca    8.7      07 Aug 2019 07:05  Phos  2.7     08-07  Mg     2.3     08-07    TPro  6.6  /  Alb  3.3<L>  /  TBili  0.9  /  DBili  0.2  /  AST  26  /  ALT  40  /  AlkPhos  57  08-07    PT/INR - ( 06 Aug 2019 09:13 )   PT: 11.0 sec;   INR: 0.99 ratio    PTT - ( 06 Aug 2019 09:13 )  PTT:27.1 sec        RADIOLOGY & ADDITIONAL STUDIES:    < from: 12 Lead ECG (08.06.19 @ 08:51) >  Ventricular Rate 68 BPM    Atrial Rate 68 BPM    P-R Interval 216 ms    QRS Duration 86 ms    Q-T Interval 378 ms    QTC Calculation(Bezet) 401 ms    P Axis 35 degrees    R Axis -26 degrees    T Axis 36 degrees    Diagnosis Line Sinus rhythm with 1st degree A-V block  Inferior infarct , age undetermined  Abnormal ECG    Confirmed by LAW NELSON, [facility] ([id number]) on 8/6/2019 9:55:54 AM    < end of copied text >  < from: CT Head No Cont (08.07.19 @ 15:39) >    EXAM:  CT BRAIN                            PROCEDURE DATE:  08/07/2019          INTERPRETATION:  CLINICAL STATEMENT: Syncope    TECHNIQUE: CT of the head was performed without IV contrast.    COMPARISON: None.    FINDINGS:      There is no acute parenchymal hemorrhage, parenchymal mass, mass effect   or midline shift. There is no extra-axial fluid collection. There is no   acute territorial infarct. There is no hydrocephalus.    The cranium is intact. The visualized paranasal sinuses are well-aerated.    IMPRESSION:     No acute intracranial hemorrhage or acute territorial infarct.                LUIS GOLDEN M.D.,ATTENDING RADIOLOGIST  This document has been electronically signed. Aug  7 2019  3:45PM                < end of copied text >

## 2019-08-07 NOTE — PROGRESS NOTE ADULT - SUBJECTIVE AND OBJECTIVE BOX
CHIEF COMPLAINT:Patient is a 53y old  Male who presents with a chief complaint of near syncope. Pt appears comfortable.    	  REVIEW OF SYSTEMS:  CONSTITUTIONAL: No fever, weight loss, or fatigue  EYES: No eye pain, visual disturbances, or discharge  ENT:  No difficulty hearing, tinnitus, vertigo; No sinus or throat pain  NECK: No pain or stiffness  RESPIRATORY: No cough, wheezing, chills or hemoptysis; No Shortness of Breath  CARDIOVASCULAR: No chest pain, palpitations, passing out, dizziness, or leg swelling  GASTROINTESTINAL: No abdominal or epigastric pain. No nausea, vomiting, or hematemesis; No diarrhea or constipation. No melena or hematochezia.  GENITOURINARY: No dysuria, frequency, hematuria, or incontinence  NEUROLOGICAL: No headaches, memory loss, loss of strength, numbness, or tremors  SKIN: No itching, burning, rashes, or lesions   LYMPH Nodes: No enlarged glands  ENDOCRINE: No heat or cold intolerance; No hair loss  MUSCULOSKELETAL: No joint pain or swelling; No muscle, back, or extremity pain  PSYCHIATRIC: No depression, anxiety, mood swings, or difficulty sleeping  HEME/LYMPH: No easy bruising, or bleeding gums  ALLERGY AND IMMUNOLOGIC: No hives or eczema	      PHYSICAL EXAM:  T(C): 36.6 (08-07-19 @ 05:01), Max: 36.8 (08-06-19 @ 14:24)  HR: 62 (08-07-19 @ 05:01) (62 - 77)  BP: 127/80 (08-07-19 @ 05:01) (109/66 - 128/75)  RR: 17 (08-07-19 @ 05:01) (17 - 18)  SpO2: 98% (08-07-19 @ 05:01) (97% - 100%)  Wt(kg): --  I&O's Summary      Appearance: Normal	  HEENT:   Normal oral mucosa, PERRL, EOMI	  Lymphatic: No lymphadenopathy  Cardiovascular: Normal S1 S2, No JVD, No murmurs, No edema  Respiratory: Lungs clear to auscultation	  Psychiatry: A & O x 3, Mood & affect appropriate  Gastrointestinal:  Soft, Non-tender, + BS	  Skin: No rashes, No ecchymoses, No cyanosis	  Neurologic: Non-focal  Extremities: Normal range of motion, No clubbing, cyanosis or edema  Vascular: Peripheral pulses palpable 2+ bilaterally    MEDICATIONS  (STANDING):  aspirin  chewable 81 milliGRAM(s) Oral daily  atorvastatin 40 milliGRAM(s) Oral at bedtime  enoxaparin Injectable 40 milliGRAM(s) SubCutaneous daily  metoprolol tartrate 12.5 milliGRAM(s) Oral two times a day        	  LABS:	 	      CARDIAC MARKERS ( 06 Aug 2019 21:30 )  <0.015 ng/mL / x     / 142 U/L / x     / 1.1 ng/mL  CARDIAC MARKERS ( 06 Aug 2019 16:08 )  <0.015 ng/mL / x     / 172 U/L / x     / 1.3 ng/mL  CARDIAC MARKERS ( 06 Aug 2019 09:13 )  <0.015 ng/mL / x     / x     / x     / 1.7 ng/mL                                13.4   7.19  )-----------( 169      ( 07 Aug 2019 07:05 )             40.7     08-07    139  |  103  |  12  ----------------------------<  107<H>  3.8   |  28  |  0.93    Ca    8.7      07 Aug 2019 07:05  Phos  2.7     08-07  Mg     2.3     08-07    TPro  6.6  /  Alb  3.3<L>  /  TBili  0.9  /  DBili  0.2  /  AST  26  /  ALT  40  /  AlkPhos  57  08-07    proBNP: Serum Pro-Brain Natriuretic Peptide: 286 pg/mL (08-06 @ 09:13)    Lipid Profile: Cholesterol 151  LDL 49  HDL 40      TSH: Thyroid Stimulating Hormone, Serum: 0.91 uU/mL (08-07 @ 07:05)  Thyroid Stimulating Hormone, Serum: 1.17 uU/mL (08-06 @ 09:13)      	    OBSERVATIONS:  Mitral Valve: Normal mitral valve.  Aortic Root: Aortic Root: 3.4 cm.    Aortic Valve: Normal trileaflet aortic valve.  Left Atrium: Normal left atrium.  LA volume index = 24  cc/m2.  Left Ventricle: Normal Left Ventricular Systolic Function,  (EF = 55 to 60%) Normal left ventricular internal  dimensions and wall thicknesses. Grade I diastolic  dysfunction (Impaired relaxation).  Right Heart: Normal right atrium. Normal right ventricular  size and systolic function (TAPSE  2.1cm). There is trace  tricuspid regurgitation. There is mild pulmonic  regurgitation.  Pericardium/PleuraNormal pericardium with no pericardial  effusion.  Hemodynamic: RA Pressure is 8 mm Hg. RV systolic pressure  is normal at  27 mm Hg.

## 2019-08-07 NOTE — DISCHARGE NOTE PROVIDER - NSDCQMERRANDS_GEN_ALL_CORE
Detail Level: Detailed
Quality 130: Documentation Of Current Medications In The Medical Record: Current Medications with Name, Dosage, Frequency, or Route not Documented, Reason not Given
Quality 226: Preventive Care And Screening: Tobacco Use: Screening And Cessation Intervention: Patient screened for tobacco use and is an ex/non-smoker
Quality 110: Preventive Care And Screening: Influenza Immunization: Influenza immunization was not ordered or administered, reason not given
Quality 431: Preventive Care And Screening: Unhealthy Alcohol Use - Screening: Patient screened for unhealthy alcohol use using a single question and scores 2 or greater episodes per year and brief intervention did not occur
No

## 2019-08-07 NOTE — DISCHARGE NOTE PROVIDER - HOSPITAL COURSE
53 years old male with no formal med hx, active smoker, presented with near syncope. Patient noted that this morning while working, he suddenly started feeling dizzy, sweaty, lightheaded, a little short of breath and left sided non radiating chest pain. He had a syncopal vs near syncopal episode which he describes as he was too weak to stand and he slowly lowered down to ground feeling like his blood pressure was low . Patient's symptoms resolved on their own. EMS was called and found him with normal vitals and alert and oriented x3. He was transported to the ER where he noted some mild chest pain in the left chest. Dizziness was resolved. He has an outpatient cardiologist in [place] and was scheduled to have nuclear scan of heart this week. Patient had 1 similar episode of syncope before as well. No family history of cardiac disease in family.    Patient denied any shortness of breath, palpitations fever, cough, vision loss, rhinorrhea, dysuria, numbness, rash, bleeding, fever, vomiting, change in bowel and urinary habits.        He was admitted for a syncope workup. Cardiology followed the patient. Troponins were negative x3, EKG showed no evidence of ischemic changes, Echo noted only G1DD, a stress test was negative for ischemic disease.         Neurology was then consulted and the patient underwent carotid duplex which _______ and CT head which _______.        Patients symptoms have resolved and he has been stable during his stay. He was counseled on smoking and stated _______.        He is stable for discharge to home. 53 years old male with no formal med hx, active smoker, presented with near syncope. Patient noted that this morning while working, he suddenly started feeling dizzy, sweaty, lightheaded, a little short of breath and left sided non radiating chest pain. He had a syncopal vs near syncopal episode which he describes as he was too weak to stand and he slowly lowered down to ground feeling like his blood pressure was low . Patient's symptoms resolved on their own. EMS was called and found him with normal vitals and alert and oriented x3. He was transported to the ER where he noted some mild chest pain in the left chest. Dizziness was resolved. He has an outpatient cardiologist in [place] and was scheduled to have nuclear scan of heart this week. Patient had 1 similar episode of syncope before as well. No family history of cardiac disease in family.    Patient denied any shortness of breath, palpitations fever, cough, vision loss, rhinorrhea, dysuria, numbness, rash, bleeding, fever, vomiting, change in bowel and urinary habits.        He was admitted for a syncope workup. Cardiology followed the patient. Troponins were negative x3, EKG showed no evidence of ischemic changes, Echo noted only G1DD, a stress test was negative for ischemic disease.         Neurology was then consulted and the patient underwent carotid duplex which was negative and CT head which showed no evidence of stroke or hemorrhage. An EEG was performed which ruled out epilepsy. He will follow up with an outpatient ENT to continue management of his vertigo.         Patients symptoms have resolved and he has been stable during his stay. He was counseled on smoking and expressed interest in varenicline therapy. He will follow up with his PCP in 1 week to continue counseling in this regard.        He is stable for discharge to home.

## 2019-08-07 NOTE — CONSULT NOTE ADULT - ATTENDING COMMENTS
I have seen and examined the patient, and I agree with the above history, examination, assessment, and plan.

## 2019-08-07 NOTE — CONSULT NOTE ADULT - ASSESSMENT
54yo male w/ transient LOC    Recommendation:    Continue Telemetry     EEG to evaluate non-convulsive seizure    Please obtain orthostatic BP & HR    Fluid hydration 52yo male w/ transient LOC    Recommendation:    Continue Telemetry     EEG to evaluate for non-convulsive seizure    Please obtain orthostatic BP & HR    Fluid hydration

## 2019-08-07 NOTE — PROGRESS NOTE ADULT - SUBJECTIVE AND OBJECTIVE BOX
53 years old male with no formal med hx, active smoker,, presented with near syncope. Patient notes that this morning while working, he suddenly started feeling dizzy, sweaty, lightheaded, a little short of breath and left sided non radiating chest pain. he had a syncopal vs near syncopal episode which he describes as he was too weak to stand and he slowly lied down to ground and he felt like his blood pressure is low . Patient's symptoms resolved on their own. EMS was called to scene and found him with normal vitals and alert and oriented x3. he was transported to the er. patient presently notes some chest pain in the left chest but it is mild now. pt symptom of dizziness has resolved. patient has an outpatient cardiologist in [place] and was scheduled to have nuclear scan of heart tomorrow. patient has 1 similar episode of syncope before as well. No family history of cardiac disease in family.  patient denies any shortness of breath, palpitations fever, cough, vision loss, rhinorrhea, dysuria, numbness, rash, bleeding, fever, vomiting, change in bowel and urinary habits.    pt seen in tele [ x ], reg med floor [   ], bed [ x ], chair at bedside [   ], a+o x3 [ x ], lethargic [  ],  nad [ x ]        Allergies    No Known Allergies        Vitals    T(F): 97.8 (08-07-19 @ 05:01), Max: 98.3 (08-06-19 @ 14:24)  HR: 62 (08-07-19 @ 05:01) (62 - 77)  BP: 127/80 (08-07-19 @ 05:01) (109/66 - 128/75)  RR: 17 (08-07-19 @ 05:01) (17 - 18)  SpO2: 98% (08-07-19 @ 05:01) (97% - 100%)  Wt(kg): --  CAPILLARY BLOOD GLUCOSE          Labs                          13.4   7.19  )-----------( 169      ( 07 Aug 2019 07:05 )             40.7       08-07    139  |  103  |  12  ----------------------------<  107<H>  3.8   |  28  |  0.93    Ca    8.7      07 Aug 2019 07:05  Phos  2.7     08-07  Mg     2.3     08-07    TPro  6.6  /  Alb  3.3<L>  /  TBili  0.9  /  DBili  0.2  /  AST  26  /  ALT  40  /  AlkPhos  57  08-07      CARDIAC MARKERS ( 06 Aug 2019 21:30 )  <0.015 ng/mL / x     / 142 U/L / x     / 1.1 ng/mL  CARDIAC MARKERS ( 06 Aug 2019 16:08 )  <0.015 ng/mL / x     / 172 U/L / x     / 1.3 ng/mL  CARDIAC MARKERS ( 06 Aug 2019 09:13 )  <0.015 ng/mL / x     / x     / x     / 1.7 ng/mL            Radiology Results        Meds    MEDICATIONS  (STANDING):  aspirin  chewable 81 milliGRAM(s) Oral daily  atorvastatin 40 milliGRAM(s) Oral at bedtime  enoxaparin Injectable 40 milliGRAM(s) SubCutaneous daily  metoprolol tartrate 12.5 milliGRAM(s) Oral two times a day      MEDICATIONS  (PRN):      Physical Exam    Neuro :  no focal deficits  Respiratory: CTA B/L  CV: RRR, S1S2, no murmurs,   Abdominal: Soft, NT, ND +BS,  Extremities: No edema, + peripheral pulses    ASSESSMENT    Syncope and collapse  r/o acs  r/o cns patho        PLAN    cont tele,   cont aspirin, statin,   ce q8 x3 neg noted above  cardio f/u  Echocardiogram.  check Orthostatic BP q shift.  f/u carotid duplex  neuro cons.  f/u ct head  advise Smoking cessation.  cont current meds.

## 2019-08-07 NOTE — DISCHARGE NOTE PROVIDER - NSDCCPCAREPLAN_GEN_ALL_CORE_FT
PRINCIPAL DISCHARGE DIAGNOSIS  Diagnosis: Syncope, unspecified syncope type  Assessment and Plan of Treatment: You presented due to an episode of syncope associated with mild chest pain. You received a full cardiac workup including EKG, cardiac enzymes, echocardiogram, and stress test which were negative for any ischemic disease. Addiitonally, you were examined by neurology and recieved a carotid ultrasound and CT head which were ______. Please follow with your primary care provider.      SECONDARY DISCHARGE DIAGNOSES  Diagnosis: Needs smoking cessation education  Assessment and Plan of Treatment: PRINCIPAL DISCHARGE DIAGNOSIS  Diagnosis: Syncope, unspecified syncope type  Assessment and Plan of Treatment: You presented due to an episode of syncope associated with mild chest pain. You received a full cardiac workup including EKG, cardiac enzymes, echocardiogram, and stress test which were negative for any ischemic disease. Addiitonally, you were examined by neurology and recieved a carotid ultrasound and CT head which were negative. An EEG was done to rule out epilepsy, which was negative as well. Please follow with your primary care provider.      SECONDARY DISCHARGE DIAGNOSES  Diagnosis: Needs smoking cessation education  Assessment and Plan of Treatment: You have been counseled regarding the dangers of smoking and benefits of smoking cessation. You expressed interest in smoking cessation and have been provided a prescription for varenicline, a medication to help you to quit smoking. Your prescription is for the first three days of therapy to allow you time to see your primary care physician for continued counseling and continuation of the 12 week medication course. Please follow up with your doctor in the next 3 days.

## 2019-08-08 ENCOUNTER — TRANSCRIPTION ENCOUNTER (OUTPATIENT)
Age: 54
End: 2019-08-08

## 2019-08-08 VITALS
SYSTOLIC BLOOD PRESSURE: 129 MMHG | DIASTOLIC BLOOD PRESSURE: 82 MMHG | HEART RATE: 69 BPM | RESPIRATION RATE: 18 BRPM | OXYGEN SATURATION: 99 % | TEMPERATURE: 98 F

## 2019-08-08 LAB
GLUCOSE BLDC GLUCOMTR-MCNC: 111 MG/DL — HIGH (ref 70–99)
GLUCOSE BLDC GLUCOMTR-MCNC: 116 MG/DL — HIGH (ref 70–99)

## 2019-08-08 PROCEDURE — 99233 SBSQ HOSP IP/OBS HIGH 50: CPT

## 2019-08-08 PROCEDURE — 95819 EEG AWAKE AND ASLEEP: CPT | Mod: 26

## 2019-08-08 RX ORDER — FEXOFENADINE HCL 30 MG
1 TABLET ORAL
Qty: 0 | Refills: 0 | DISCHARGE

## 2019-08-08 RX ORDER — ASPIRIN/CALCIUM CARB/MAGNESIUM 324 MG
1 TABLET ORAL
Qty: 0 | Refills: 0 | DISCHARGE

## 2019-08-08 RX ADMIN — Medication 12.5 MILLIGRAM(S): at 06:01

## 2019-08-08 NOTE — PROGRESS NOTE ADULT - PROBLEM SELECTOR PLAN 2
IMPROVE VTE Individual Risk Assessment  RISK                                                          Points  [] Previous VTE                                           3  [] Thrombophilia                                        2  [] Lower limb paralysis                              2   [] Current Cancer                                       2   [] Immobilization > 24 hrs                        1  [] ICU/CCU stay > 24 hours                       1  [] Age > 60                                                   1  IMPROVE VTE Score = 1  lovenox 40 mg for DVT chemoprophylaxis
IMPROVE VTE Individual Risk Assessment  RISK                                                          Points  [] Previous VTE                                           3  [] Thrombophilia                                        2  [] Lower limb paralysis                              2   [] Current Cancer                                       2   [] Immobilization > 24 hrs                        1  [] ICU/CCU stay > 24 hours                       1  [] Age > 60                                                   1  IMPROVE VTE Score = 1  lovenox 40 mg for DVT chemoprophylaxis

## 2019-08-08 NOTE — PROGRESS NOTE ADULT - SUBJECTIVE AND OBJECTIVE BOX
Interview conducted in German. Patient has a normal neuro exam and normal EEG, with no new neurological events overnight.    Likely diagnosis is syncope.    Recs:    Syncope Workup and Management  - Monitor orthostastic BP & HR with each vital signs check  - Encourage plentiful fluid hydration  - Caution with rapid changes in position  - Meclizine PRN dizziness/vertigo  - Follow up in Neurology clinic as needed      NOTE TO BE COMPLETED - PLEASE REFER TO ABOVE ONLY AND IGNORE INFORMATION BELOW    Neurology Follow up note    Name  MARCELINO NOE    HPI:  53 years old male with no formal med hx, active smoker,, presented with near syncope. Patient notes that this morning while working, he suddenly started feeling dizzy, sweaty, lightheaded, a little short of breath and left sided non radiating chest pain. he had a syncopal vs near syncopal episode which he describes as he was too weak to stand and he slowly lied down to ground and he felt like his blood pressure is low . Patient's symptoms resolved on their own. EMS was called to scene and found him with normal vitals and alert and oriented x3. he was transported to the er. patient presently notes some chest pain in the left chest but it is mild now. pt symptom of dizziness has resolved. patient has an outpatient cardiologist in [place] and was scheduled to have nuclear scan of heart tomorrow. patient has 1 similar episode of syncope before as well. No family history of cardiac disease in family.  patient denies any shortness of breath, palpitations fever, cough, vision loss, rhinorrhea, dysuria, numbness, rash, bleeding, fever, vomiting, change in bowel and urinary habits. (06 Aug 2019 11:04)      Interval History -        Subjective:        MEDICATIONS  (STANDING):  aspirin  chewable 81 milliGRAM(s) Oral daily  atorvastatin 40 milliGRAM(s) Oral at bedtime  enoxaparin Injectable 40 milliGRAM(s) SubCutaneous daily  metoprolol tartrate 12.5 milliGRAM(s) Oral two times a day    MEDICATIONS  (PRN):      Allergies    No Known Allergies    Intolerances        Review of Systems:  General: [ ] None, [ ] chills, [ ]fatigue, [ ] fevers  Skin: [ ] None, [ ] rash   HEENT: [ ] None, [ ] head injury, [ ] blurred vision, [ ] double vision, [ ] eye pain, [ ] visual loss, [ ] hearing loss, [ ] deafness, [ ] ear pain, [ ] ringing in the ears, [ ] vertigo, [ ] sinus pain, [ ] voice changes  Neck: [ ] None, [ ] neck stiffness  Respiratory: [ ] None, [ ] cough, [ ] difficulty breathing  Cardiovascular: [ ] None, [ ] calf cramps, [ ] chest pain, [ ] leg pain, [ ] swelling, [ ] rapid heart rate, [ ] shortness of breath  Gastrointestinal: [ ] None, [ ] abdominal pain, [ ] nausea, [ ] vomiting  Musculoskeletal: [ ] None, [ ] back pain, [ ] joint pain, [ ] joint stiffness, [ ] leg cramps, [ ] muscle atrophy, [ ] muscle cramps, [ ] muscle weakness, [ ] swelling of extremities  Neurological: [ ] None, [ ] Dizziness, [ ] decreased memory, [ ] fainting, [ ] focal neurological symptoms, [ ] headaches, [ ] incontinence of stool, [ ] incontinence of urine, [ ] loss of consciousness, [ ] numbness, [ ] seizures, [ ] spinning sensation, [ ] stroke, [ ] trouble walking, [ ] unsteadiness, [ ] visual changes, [ ] weakness  Psychiatric: [ ] None,  [ ] depression, [ ] anxiety, [ ] hallucinations, [ ] inability to concentrate, [ ] mood changes, [ ] panic attacks  Hematology: [ ] None,  [ ] blood clots, [ ] spontaneous bleeding      Objective:   Vital Signs Last 24 Hrs  T(C): 36.8 (08 Aug 2019 15:00), Max: 36.8 (08 Aug 2019 15:00)  T(F): 98.3 (08 Aug 2019 15:00), Max: 98.3 (08 Aug 2019 15:00)  HR: 69 (08 Aug 2019 15:00) (54 - 69)  BP: 129/82 (08 Aug 2019 15:00) (117/70 - 129/82)  BP(mean): --  RR: 18 (08 Aug 2019 15:00) (17 - 18)  SpO2: 99% (08 Aug 2019 15:00) (96% - 99%)    General Exam:   General appearance: No acute distress                 Cardiovascular: Pedal dorsalis pulses intact bilaterally    Neurological Exam:  Mental Status: Orientated to self, date and place.  Attention intact.  No dysarthria, aphasia or neglect.  Knowledge intact.  Registration intact.  Short and long term memory grossly intact.      Cranial Nerves: CN I - not tested.  PERRL, EOMI, VFF, no nystagmus or diplopia.  No APD.  Fundi not visualized bilaterally.  CN V1-3 intact to light touch and pinprick.  No facial asymmetry.  Hearing intact to finger rub bilaterally.  Tongue, uvula and palate midline.  Sternocleidomastoid and Trapezius intact bilaterally.    Motor:   Tone: normal.                  Strength: intact throughout  Pronator drift: none                 Dysmeria: None to finger-nose-finger or heel-shin-heel  No truncal ataxia.    Tremor: No resting, postural or action tremor.  No myoclonus.    Sensation: intact to light touch, pinprick, vibration and proprioception    Deep Tendon Reflexes: 1+ bilateral biceps, triceps, brachioradialis, knee and ankle  Toes flexor bilaterally    Gait: normal and stable.      Other:    08-07    139  |  103  |  12  ----------------------------<  107<H>  3.8   |  28  |  0.93    Ca    8.7      07 Aug 2019 07:05  Phos  2.7     08-07  Mg     2.3     08-07    TPro  6.6  /  Alb  3.3<L>  /  TBili  0.9  /  DBili  0.2  /  AST  26  /  ALT  40  /  AlkPhos  57  08-07    08-07    139  |  103  |  12  ----------------------------<  107<H>  3.8   |  28  |  0.93    Ca    8.7      07 Aug 2019 07:05  Phos  2.7     08-07  Mg     2.3     08-07    TPro  6.6  /  Alb  3.3<L>  /  TBili  0.9  /  DBili  0.2  /  AST  26  /  ALT  40  /  AlkPhos  57  08-07    LIVER FUNCTIONS - ( 07 Aug 2019 07:05 )  Alb: 3.3 g/dL / Pro: 6.6 g/dL / ALK PHOS: 57 U/L / ALT: 40 U/L DA / AST: 26 U/L / GGT: x             Radiology    EKG:  tele:  TTE:  EEG:                 Please contact the Neurology consult service with any questions.    Viet Lambert MD  Neurology Attending  [facility] Neurology Follow up note    Name  MARCELINO NOE    HPI:  53 years old male with no formal med hx, active smoker,, presented with near syncope. Patient notes that this morning while working, he suddenly started feeling dizzy, sweaty, lightheaded, a little short of breath and left sided non radiating chest pain. he had a syncopal vs near syncopal episode which he describes as he was too weak to stand and he slowly lied down to ground and he felt like his blood pressure is low . Patient's symptoms resolved on their own. EMS was called to scene and found him with normal vitals and alert and oriented x3. he was transported to the er. patient presently notes some chest pain in the left chest but it is mild now. pt symptom of dizziness has resolved. patient has an outpatient cardiologist in [place] and was scheduled to have nuclear scan of heart tomorrow. patient has 1 similar episode of syncope before as well. No family history of cardiac disease in family.  patient denies any shortness of breath, palpitations fever, cough, vision loss, rhinorrhea, dysuria, numbness, rash, bleeding, fever, vomiting, change in bowel and urinary habits. (06 Aug 2019 11:04)    Interval History -  Interview conducted in Guinean. No new events concerning for syncope have occurred overnight.    MEDICATIONS  (STANDING):  aspirin  chewable 81 milliGRAM(s) Oral daily  atorvastatin 40 milliGRAM(s) Oral at bedtime  enoxaparin Injectable 40 milliGRAM(s) SubCutaneous daily  metoprolol tartrate 12.5 milliGRAM(s) Oral two times a day    Allergies  No Known Allergies    Review of Systems: Fourteen systems reviewed and negative except as in HPI / Interval History.      Objective:   Vital Signs Last 24 Hrs  T(C): 36.8 (08 Aug 2019 15:00), Max: 36.8 (08 Aug 2019 15:00)  T(F): 98.3 (08 Aug 2019 15:00), Max: 98.3 (08 Aug 2019 15:00)  HR: 69 (08 Aug 2019 15:00) (54 - 69)  BP: 129/82 (08 Aug 2019 15:00) (117/70 - 129/82)  RR: 18 (08 Aug 2019 15:00) (17 - 18)  SpO2: 99% (08 Aug 2019 15:00) (96% - 99%)    General Exam:  General: No acute distress  Respiratory: CTAB/l.  No crackles, rhonchi, or wheezes.  Cardiovascular: RRR, No murmurs, Full b/l radial and pedal pulses    Neurological Exam:  General / Mental Status: Oriented to person, place, and time.  No dysarthria or aphasia present.  Naming and repetition intact.  Cranial Nerves: PERRLA, EOMI x 2, VFF x 4, No nystagmus or diplopia, Optic discs normal b/l.  B/l V1-V3 equal to light touch and pinprick.  Symmetric facial movement and palate elevation.  B/l hearing equal to finger rub.  Negative Eun-Hallpike maneuver b/l.  5/5 strength with b/l sternocleidomastoid and trapezius.  Midline tongue protrusion with no atrophy or fasciculations.  Motor: Normal bulk and tone in all four extremities.  5/5 strength throughout all four extremities.  No downward drift, rigidity, spasticity, or tremors in any of the four extremities.  Sensation: Intact to light touch and pinprick in all four extremities.  Negative Romberg.  Coordination: No dysmetria with b/l finger-to-nose and heel raise tests.  Normal rapid alternating movements b/l.  Reflexes: 2+ and symmetric at b/l biceps, triceps, brachioradialis, patellae, and ankles.  Toes flexor b/l.  Gait: Narrow-based and normal. No difficulty with tiptoe, heel, and tandem gaits.      Labs:    08-07    139  |  103  |  12  ----------------------------<  107<H>  3.8   |  28  |  0.93    Ca    8.7      07 Aug 2019 07:05  Phos  2.7     08-07  Mg     2.3     08-07    TPro  6.6  /  Alb  3.3<L>  /  TBili  0.9  /  DBili  0.2  /  AST  26  /  ALT  40  /  AlkPhos  57  08-07      Neuroimaging:    CT Head (8/7/19): No acute intracranial abnormality    Carotid Duplex (8/7/19): No hemodynamically significant stenosis in b/l carotid arteries    EEG (8/8/19): Normal awake, drowsy, and asleep study      Assessment:  53 RHM with likely syncopal event, without evidence of seizure or other primary neurological condition.      Recommendations:    Syncope Workup and Management    - Monitor orthostatic BP & HR with each vital signs check    - Encourage plentiful fluid hydration    - Caution with rapid changes in position    - Meclizine PRN dizziness/vertigo    - Follow up in Neurology clinic as needed      Please contact the Neurology consult service with any questions.    Viet Lambert MD  Neurology Attending  [facility]

## 2019-08-08 NOTE — PROGRESS NOTE ADULT - ASSESSMENT
53 years old male with no formal med hx, active smoker,, presented with near syncope. Patient notes that this morning while working, he suddenly started feeling dizzy, sweaty, lightheaded, a little short of breath and left sided non radiating chest pain. he had a syncopal vs near syncopal episode which he describes as he was too weak to stand and he slowly lied down to ground and he felt like his blood pressure is low . Patient's symptoms resolved on their own. EMS was called to scene and found him with normal vitals and alert and oriented x3. he was transported to the er. patient presently notes some chest pain in the left chest but it is mild now. pt symptom of dizziness has resolved. patient has an outpatient cardiologist in [place] and was scheduled to have nuclear scan of heart tomorrow. patient has 1 similar episode of syncope before as well. No family history of cardiac disease in family.  patient denies any shortness of breath, palpitations fever, cough, vision loss, rhinorrhea, dysuria, numbness, rash, bleeding, fever, vomiting, change in bowel and urinary habits.  patient is admitted for syncope workup
53 years old male with no formal med hx, active smoker, presented with near syncope.  1.D/C Tele monitoring.  2.Smoking cessation.  3.Weight loss.  4.Prediabetes-diet.  5.Cont asa,statin,d/c b blocker.  6.GI and DVT prophylaxis.
53 years old male with no formal med hx, active smoker, presented with near syncope.  1.Tele monitoring.  2.Stress test-r/o ischemia.  3.Orthostatic BP -.  4.Smoking cessation.  5.Cont asa,statin,b blocker.  6.GI and DVT prophylaxis.
53 years old male with no formal med hx, active smoker,, presented with near syncope. Patient notes that this morning while working, he suddenly started feeling dizzy, sweaty, lightheaded, a little short of breath and left sided non radiating chest pain. he had a syncopal vs near syncopal episode which he describes as he was too weak to stand and he slowly lied down to ground and he felt like his blood pressure is low . Patient's symptoms resolved on their own. EMS was called to scene and found him with normal vitals and alert and oriented x3. he was transported to the er. patient presently notes some chest pain in the left chest but it is mild now. pt symptom of dizziness has resolved. patient has an outpatient cardiologist in [place] and was scheduled to have nuclear scan of heart tomorrow. patient has 1 similar episode of syncope before as well. No family history of cardiac disease in family.  patient denies any shortness of breath, palpitations fever, cough, vision loss, rhinorrhea, dysuria, numbness, rash, bleeding, fever, vomiting, change in bowel and urinary habits.  patient is admitted for syncope workup

## 2019-08-08 NOTE — DISCHARGE NOTE NURSING/CASE MANAGEMENT/SOCIAL WORK - NSDCPEWEB_GEN_ALL_CORE
[facility] for Tobacco Control website --- [email or web address] website --- [email or web address]

## 2019-08-08 NOTE — DISCHARGE NOTE NURSING/CASE MANAGEMENT/SOCIAL WORK - NSDCDPATPORTLINK_GEN_ALL_CORE
You can access the [facility] Patient Portal, offered by [facility], by registering with the following website: [email or web address]

## 2019-08-08 NOTE — PROGRESS NOTE ADULT - PROVIDER SPECIALTY LIST ADULT
Cardiology
Cardiology
Internal Medicine
Neurology
Internal Medicine

## 2019-08-08 NOTE — PROGRESS NOTE ADULT - SUBJECTIVE AND OBJECTIVE BOX
PGY1 Note discussed with supervising resident and primary attending.    Patient is a 53y old  Male who presents with a chief complaint of near syncope. (07 Aug 2019 09:50)      INTERVAL HPI/OVERNIGHT EVENTS:  No acute events overnight.   EEG today as per neuro  Orthostatics negative    MEDICATIONS  (STANDING):  aspirin  chewable 81 milliGRAM(s) Oral daily  atorvastatin 40 milliGRAM(s) Oral at bedtime  enoxaparin Injectable 40 milliGRAM(s) SubCutaneous daily  metoprolol tartrate 12.5 milliGRAM(s) Oral two times a day    MEDICATIONS  (PRN):        Allergies    No Known Allergies    Intolerances        REVIEW OF SYSTEMS:  CONSTITUTIONAL: No fever, weight loss, or fatigue  RESPIRATORY: No cough, wheezing, chills or hemoptysis; No shortness of breath  CARDIOVASCULAR: No chest pain, palpitations, dizziness, or leg swelling  GASTROINTESTINAL: No abdominal or epigastric pain. No nausea, vomiting, or hematemesis; No diarrhea or constipation. No melena or hematochezia.  NEUROLOGICAL: No headaches, memory loss, loss of strength, numbness, or tremors  SKIN: No itching, burning, rashes, or lesions     Vital Signs Last 24 Hrs  T(C): 36.3 (08 Aug 2019 05:12), Max: 36.7 (07 Aug 2019 21:10)  T(F): 97.4 (08 Aug 2019 05:12), Max: 98 (07 Aug 2019 21:10)  HR: 62 (08 Aug 2019 05:12) (54 - 79)  BP: 117/70 (08 Aug 2019 05:12) (117/70 - 137/76)  BP(mean): --  RR: 18 (08 Aug 2019 05:12) (17 - 181)  SpO2: 99% (08 Aug 2019 05:12) (96% - 100%)        PHYSICAL EXAM:  GENERAL: NAD, well-groomed, well-developed  HEAD:  Atraumatic, Normocephalic  EYES: EOMI, PERRLA, conjunctiva and sclera clear  NECK: Supple, No JVD, Normal thyroid  CHEST/LUNG: Clear to percussion bilaterally; No rales, rhonchi, wheezing, or rubs  HEART: Orthostatics negative to date. Regular rate and rhythm; No murmurs, rubs, or gallops  ABDOMEN: Soft, Nontender, Nondistended; Bowel sounds present  NERVOUS SYSTEM:  Alert & Oriented X3, Good concentration; Motor Strength 5/5 B/L   EXTREMITIES:  2+ Peripheral Pulses, No clubbing, cyanosis, or edema  SKIN;    LABS:                                              13.4   7.19  )-----------( 169      ( 07 Aug 2019 07:05 )             40.7         08-07    139  |  103  |  12  ----------------------------<  107<H>  3.8   |  28  |  0.93    Ca    8.7      07 Aug 2019 07:05  Phos  2.7     08-07  Mg     2.3     08-07    TPro  6.6  /  Alb  3.3<L>  /  TBili  0.9  /  DBili  0.2  /  AST  26  /  ALT  40  /  AlkPhos  57  08-07    PT/INR - ( 06 Aug 2019 09:13 )   PT: 11.0 sec;   INR: 0.99 ratio         PTT - ( 06 Aug 2019 09:13 )  PTT:27.1 sec    CARDIAC MARKERS ( 06 Aug 2019 21:30 )  <0.015 ng/mL / x     / 142 U/L / x     / 1.1 ng/mL  CARDIAC MARKERS ( 06 Aug 2019 16:08 )  <0.015 ng/mL / x     / 172 U/L / x     / 1.3 ng/mL  CARDIAC MARKERS ( 06 Aug 2019 09:13 )  <0.015 ng/mL / x     / x     / x     / 1.7 ng/mL              RADIOLOGY & ADDITIONAL TESTS:  < from: Nuclear Stress Test-Pharmacologic (19 @ 05:36) >  PATIENT: MARCELINO NOE  : 1965   AGE: 53 (M)   MR#: [id number]  STUDY DATE: 2019  LOCATION: [facility]  REF. PHYSICIAN(S):  KUSUM JANE MD  FELLOW:  ------------------------------------------------------------------------    TYPE OF TEST: Rest/Stress Pharmacologic  INDICATION: Syncope and Collapse (R55)  HEIGHT: 176 cm  WEIGHT: 96.0 kg  ------------------------------------------------------------------------  HISTORY:  CARDIAC HISTORY: 53 years old male with  OTHER HISTORY:none  MEDICATIONS: Aspirin,Lovenox,Lipitor,Lopressor  ------------------------------------------------------------------------    BASELINE ELECTROCARDIOGRAM:  Rhythm: Sinus Bradycardia with nl axis    ------------------------------------------------------------------------    HEMODYNAMIC PARAMETERS:                                      HR      BP  Baseline  Pre-Injection             53  141/80  00:00     Inject Regadenoson         0  00:30     Post Injection             0  01:00     Post Injection            65  133/80  02:00     Post Injection            65  115/80  05:00     Recovery                  61  134/79  06:00     Recovery                  62  114/86  07:00     Recovery                  71  129/64  10:00     Recovery 57  132/78  ------------------------------------------------------------------------    Agent: Regadenoson 0.4 mg/5 ml NS. injected over 10 sec.  HR: Baseline HR: 53 bpm   Peak HR: 71 bpm (43% of MPHR)  MPHR: 165 bpm   85% of MPHR: 140 bpm  BP: Baseline BP: 141/80 mmHg   Peak BP: 141/80 mmHg   Peak  RPP: [id number] (Rate Pressure Product)  HR Isotope Injected: 53 bpm (Tc 99m Tetrofosmin)  55 bpm (Tc 99m Tetrofosmin)  Last Caffeine intake: 12 hrs  Terminated: Completion of protocol  ------------------------------------------------------------------------    SYMPTOMS/FINDINGS:  Symptoms: Flushing  Chest pain: No chest pain with administration of  Regadenoson  ------------------------------------------------------------------------    ECG ABNORMALITIES DURING/AFTER STRESS:   Abnormalities: None  ------------------------------------------------------------------------    NEW ARRHYTHMIAS DEVELOPED DURING/AFTER STRESS:  None  ------------------------------------------------------------------------    STRESS TEST IMPRESSIONS:  Negative ECG evidence of ischemia after IV of  Lexiscan.  ------------------------------------------------------------------------    PROCEDURE:  9.8 mCi of Tc 99m Tetrofosmin were injected intravenously  at rest. Approximately 45 minutes later, tomographic  images were obtained in a 180 degree arc from right  anterior oblique to left anterior oblique with 64 stops.  At a separate time, 30.1 mCi of Tc 99m Tetrofosmin were  injected intravenously during stress protocol.  Approximately 45 minute(s) later, tomographic images were  obtained in a 180 degree arc from right anterior oblique  to left anterior oblique with 64 stops. The tomographic  slices were reconstructed in 3 orthogonal planes (short  axis, horizontal long axis and vertical long axis).  Interpretation was performed both by visual and  quantitative analysis.    ------------------------------------------------------------------------    NUCLEAR FINDINGS:  Review of raw data shows: The study is of goodtechnical  quality.  The left ventricle was normal in size. Normal myocardial  perfusion scan, with no evidence of infarction or  inducible ischemia.  ------------------------------------------------------------------------      GATED ANALYSIS:  Gated wall motion analysis is performed, and shows normal  wall motion with post stress LVEF of 64%.  ------------------------------------------------------------------------      LV/RV OBSERVATION:  Normal LV ejection fraction., No segmental wall motion  abnormality.  ------------------------------------------------------------------------    IMPRESSIONS:Normal Study  * Negative ECG evidence of ischemia after IV of  Lexiscan.  * Review of raw data shows: The study is of good technical  quality.  * The left ventricle was normal in size. Normal myocardial  perfusion scan, with no evidence of infarction or  inducible ischemia.  * Gated wall motion analysis is performed, and shows  normal wall motion with post stress LVEF of 64%.    ------------------------------------------------------------------------      ------------------------------------------------------------------------    Confirmed on  2019 - 11:58:46 at [place] by  Nina Orellana MD  ------------------------------------------------------------------------    < end of copied text >      Imaging Personally Reviewed:  [ X] YES  [ ] NO    Consultant(s) Notes Reviewed:  [ ] YES  [ ] NO

## 2019-08-08 NOTE — DISCHARGE NOTE NURSING/CASE MANAGEMENT/SOCIAL WORK - NSDCPEEMAIL_GEN_ALL_CORE
[facility] for Tobacco Control email [email or web address]

## 2019-08-08 NOTE — PROGRESS NOTE ADULT - PROBLEM SELECTOR PLAN 1
Pt presented with syncope and CP.  No cardiac disease risk factors except active smoking.  Stress test negative  EKG shows no ischemic changes.  Trending cardiac troponin, 3 trops negative.  Off tele as per Dr Orellana.  Echo - G1DD   Neuro consult Dr. Lambert  CT head negative  Carotid duplex negative  EEG today as per neuro.

## 2019-08-08 NOTE — PROGRESS NOTE ADULT - SUBJECTIVE AND OBJECTIVE BOX
CHIEF COMPLAINT:Patient is a 53y old  Male who presents with a chief complaint of near syncope. Pt appears comfortable.    	  REVIEW OF SYSTEMS:  CONSTITUTIONAL: No fever, weight loss, or fatigue  EYES: No eye pain, visual disturbances, or discharge  ENT:  No difficulty hearing, tinnitus, vertigo; No sinus or throat pain  NECK: No pain or stiffness  RESPIRATORY: No cough, wheezing, chills or hemoptysis; No Shortness of Breath  CARDIOVASCULAR: No chest pain, palpitations, passing out, dizziness, or leg swelling  GASTROINTESTINAL: No abdominal or epigastric pain. No nausea, vomiting, or hematemesis; No diarrhea or constipation. No melena or hematochezia.  GENITOURINARY: No dysuria, frequency, hematuria, or incontinence  NEUROLOGICAL: No headaches, memory loss, loss of strength, numbness, or tremors  SKIN: No itching, burning, rashes, or lesions   LYMPH Nodes: No enlarged glands  ENDOCRINE: No heat or cold intolerance; No hair loss  MUSCULOSKELETAL: No joint pain or swelling; No muscle, back, or extremity pain  PSYCHIATRIC: No depression, anxiety, mood swings, or difficulty sleeping  HEME/LYMPH: No easy bruising, or bleeding gums  ALLERGY AND IMMUNOLOGIC: No hives or eczema	      PHYSICAL EXAM:  T(C): 36.3 (08-08-19 @ 05:12), Max: 36.7 (08-07-19 @ 21:10)  HR: 62 (08-08-19 @ 05:12) (54 - 79)  BP: 117/70 (08-08-19 @ 05:12) (117/70 - 137/76)  RR: 18 (08-08-19 @ 05:12) (17 - 181)  SpO2: 99% (08-08-19 @ 05:12) (96% - 100%)        Appearance: Normal	  HEENT:   Normal oral mucosa, PERRL, EOMI	  Lymphatic: No lymphadenopathy  Cardiovascular: Normal S1 S2, No JVD, No murmurs, No edema  Respiratory: Lungs clear to auscultation	  Psychiatry: A & O x 3, Mood & affect appropriate  Gastrointestinal:  Soft, Non-tender, + BS	  Skin: No rashes, No ecchymoses, No cyanosis	  Neurologic: Non-focal  Extremities: Normal range of motion, No clubbing, cyanosis or edema  Vascular: Peripheral pulses palpable 2+ bilaterally    MEDICATIONS  (STANDING):  aspirin  chewable 81 milliGRAM(s) Oral daily  atorvastatin 40 milliGRAM(s) Oral at bedtime  enoxaparin Injectable 40 milliGRAM(s) SubCutaneous daily  metoprolol tartrate 12.5 milliGRAM(s) Oral two times a day        	  LABS:	 	      CARDIAC MARKERS ( 06 Aug 2019 21:30 )  <0.015 ng/mL / x     / 142 U/L / x     / 1.1 ng/mL  CARDIAC MARKERS ( 06 Aug 2019 16:08 )  <0.015 ng/mL / x     / 172 U/L / x     / 1.3 ng/mL                                13.4   7.19  )-----------( 169      ( 07 Aug 2019 07:05 )             40.7     08-07    139  |  103  |  12  ----------------------------<  107<H>  3.8   |  28  |  0.93    Ca    8.7      07 Aug 2019 07:05  Phos  2.7     08-07  Mg     2.3     08-07    TPro  6.6  /  Alb  3.3<L>  /  TBili  0.9  /  DBili  0.2  /  AST  26  /  ALT  40  /  AlkPhos  57  08-07    proBNP: Serum Pro-Brain Natriuretic Peptide: 286 pg/mL (08-06 @ 09:13)    Lipid Profile: Cholesterol 151  LDL 49  HDL 40      HgA1c: Hemoglobin A1C, Whole Blood: 6.0 % (08-07 @ 09:28)    TSH: Thyroid Stimulating Hormone, Serum: 0.91 uU/mL (08-07 @ 07:05)  Thyroid Stimulating Hormone, Serum: 1.17 uU/mL (08-06 @ 09:13)      	  EXAM:  CT BRAIN                            PROCEDURE DATE:  08/07/2019          INTERPRETATION:  CLINICAL STATEMENT: Syncope    TECHNIQUE: CT of the head was performed without IV contrast.    COMPARISON: None.    FINDINGS:      There is no acute parenchymal hemorrhage, parenchymal mass, mass effect   or midline shift. There is no extra-axial fluid collection. There is no   acute territorial infarct. There is no hydrocephalus.    The cranium is intact.The visualized paranasal sinuses are well-aerated.    IMPRESSION:     No acute intracranial hemorrhage or acute territorial infarct.        EXAM:  US DPLX CAROTIDS COMPL BI                            PROCEDURE DATE:  08/07/2019          INTERPRETATION:  Carotid duplex Doppler ultrasound    Indication: near syncope    Comparison: None    Carotid duplex Doppler ultrasound is performed. Grayscale ultrasound   demonstrates small atherosclerotic plaque in the left carotid bulb. The   flow velocity measurements during peak systolic phase in centimeters per   second are as the following:    Right CCA 84, ICA 65, ECA 79.  Left CCA 87, ICA 60, ECA 73.    The end diastolic velocity measurement for the right ICA is 30, and  for   the left ICA is 24.    The peak systolic ICA/CCA velocity ratio on the right is 0.8, and on the   left is 0.7.    Both vertebral arteries maintain normal antegrade flow direction.    Impression: No hemodynamically significant internal carotid artery   stenosis by velocity criteria.        IMPRESSIONS:Normal Study  * Negative ECG evidence of ischemia after IV of  Lexiscan.  * Review of raw data shows: The study is of good technical  quality.  * The left ventricle was normal in size. Normal myocardial  perfusion scan, with no evidence of infarction or  inducible ischemia.  * Gated wall motion analysis is performed, and shows  normal wall motion with post stress LVEF of 64%.

## 2019-08-08 NOTE — PROGRESS NOTE ADULT - SUBJECTIVE AND OBJECTIVE BOX
Patient is a 53y old  Male who presents with a chief complaint of near syncope. (08 Aug 2019 08:17)    pt seen in tele [ x ], reg med floor [   ], bed [ x ], chair at bedside [   ], a+o x3 [ x ], lethargic [  ],  nad [ x ]          Allergies    No Known Allergies        Vitals    T(F): 97.4 (08-08-19 @ 05:12), Max: 98 (08-07-19 @ 21:10)  HR: 62 (08-08-19 @ 05:12) (54 - 79)  BP: 117/70 (08-08-19 @ 05:12) (117/70 - 137/76)  RR: 18 (08-08-19 @ 05:12) (17 - 181)  SpO2: 99% (08-08-19 @ 05:12) (96% - 100%)  Wt(kg): --  CAPILLARY BLOOD GLUCOSE      POCT Blood Glucose.: 116 mg/dL (08 Aug 2019 08:49)      Labs                          13.4   7.19  )-----------( 169      ( 07 Aug 2019 07:05 )             40.7       08-07    139  |  103  |  12  ----------------------------<  107<H>  3.8   |  28  |  0.93    Ca    8.7      07 Aug 2019 07:05  Phos  2.7     08-07  Mg     2.3     08-07    TPro  6.6  /  Alb  3.3<L>  /  TBili  0.9  /  DBili  0.2  /  AST  26  /  ALT  40  /  AlkPhos  57  08-07      CARDIAC MARKERS ( 06 Aug 2019 21:30 )  <0.015 ng/mL / x     / 142 U/L / x     / 1.1 ng/mL  CARDIAC MARKERS ( 06 Aug 2019 16:08 )  <0.015 ng/mL / x     / 172 U/L / x     / 1.3 ng/mL      < from: Nuclear Stress Test-Pharmacologic (08.07.19 @ 05:36) >  IMPRESSIONS:Normal Study  * Negative ECG evidence of ischemia after IV of  Lexiscan.  * Review of raw data shows: The study is of good technical  quality.  * The left ventricle was normal in size. Normal myocardial  perfusion scan, with no evidence of infarction or  inducible ischemia.  * Gated wall motion analysis is performed, and shows  normal wall motion with post stress LVEF of 64%.    < end of copied text >      < from: Transthoracic Echocardiogram (08.06.19 @ 11:45) >  ------------------------------------------------------------------------  CONCLUSIONS:  1. Normal mitral valve.  2. Normal trileaflet aortic valve.  3. Aortic Root: 3.4 cm.    4. Normal left atrium.  LA volume index = 24 cc/m2.  5. Normal Left Ventricular Systolic Function,  (EF = 55 to  60%)  6. Grade I diastolic dysfunction (Impaired relaxation).  7. Normal right atrium.  8. Normal right ventricular size and systolic function  (TAPSE  2.1cm).  9. RA Pressure is 8 mm Hg.  10. RV systolic pressure is normal at  27 mm Hg.  11. There is trace tricuspid regurgitation.  12. There is mild pulmonic regurgitation.  13. Normal pericardium with no pericardial effusion.    < end of copied text >      Radiology Results    < from: CT Head No Cont (08.07.19 @ 15:39) >  IMPRESSION:     No acute intracranial hemorrhage or acute territorial infarct.    < end of copied text >        < from: US Duplex Carotid Arteries Complete, Bilateral (08.07.19 @ 13:59) >  Impression: No hemodynamically significant internal carotid artery   stenosis by velocity criteria.    < end of copied text >            Meds    MEDICATIONS  (STANDING):  aspirin  chewable 81 milliGRAM(s) Oral daily  atorvastatin 40 milliGRAM(s) Oral at bedtime  enoxaparin Injectable 40 milliGRAM(s) SubCutaneous daily  metoprolol tartrate 12.5 milliGRAM(s) Oral two times a day      MEDICATIONS  (PRN):      Physical Exam    Neuro :  no focal deficits  Respiratory: CTA B/L  CV: RRR, S1S2, no murmurs,   Abdominal: Soft, NT, ND +BS,  Extremities: No edema, + peripheral pulses    ASSESSMENT    Syncope and collapse  r/o acs  r/o cns patho        PLAN    cont tele,   cont aspirin, statin,   ce q8 x3 neg noted above  cardio f/u  stress test negative EF 64%   echo noted  orthostatics -ve   neuro f/u   CT head -ve   carotid duplex -ve   EEG to evaluate non-convulsive seizure  advise Smoking cessation.  outpt ent f/u for dizziness   cont current meds.  d/c planning if EEG negative.

## 2019-10-31 PROCEDURE — 83880 ASSAY OF NATRIURETIC PEPTIDE: CPT

## 2019-10-31 PROCEDURE — 93880 EXTRACRANIAL BILAT STUDY: CPT

## 2019-10-31 PROCEDURE — 86803 HEPATITIS C AB TEST: CPT

## 2019-10-31 PROCEDURE — 93005 ELECTROCARDIOGRAM TRACING: CPT

## 2019-10-31 PROCEDURE — 84100 ASSAY OF PHOSPHORUS: CPT

## 2019-10-31 PROCEDURE — 85610 PROTHROMBIN TIME: CPT

## 2019-10-31 PROCEDURE — 82962 GLUCOSE BLOOD TEST: CPT

## 2019-10-31 PROCEDURE — 85379 FIBRIN DEGRADATION QUANT: CPT

## 2019-10-31 PROCEDURE — 82553 CREATINE MB FRACTION: CPT

## 2019-10-31 PROCEDURE — 99285 EMERGENCY DEPT VISIT HI MDM: CPT | Mod: 25

## 2019-10-31 PROCEDURE — 70450 CT HEAD/BRAIN W/O DYE: CPT

## 2019-10-31 PROCEDURE — 80053 COMPREHEN METABOLIC PANEL: CPT

## 2019-10-31 PROCEDURE — 82607 VITAMIN B-12: CPT

## 2019-10-31 PROCEDURE — 95957 EEG DIGITAL ANALYSIS: CPT

## 2019-10-31 PROCEDURE — 78452 HT MUSCLE IMAGE SPECT MULT: CPT

## 2019-10-31 PROCEDURE — 95819 EEG AWAKE AND ASLEEP: CPT

## 2019-10-31 PROCEDURE — 93017 CV STRESS TEST TRACING ONLY: CPT

## 2019-10-31 PROCEDURE — 80061 LIPID PANEL: CPT

## 2019-10-31 PROCEDURE — 83735 ASSAY OF MAGNESIUM: CPT

## 2019-10-31 PROCEDURE — 93306 TTE W/DOPPLER COMPLETE: CPT

## 2019-10-31 PROCEDURE — 80076 HEPATIC FUNCTION PANEL: CPT

## 2019-10-31 PROCEDURE — A9502: CPT

## 2019-10-31 PROCEDURE — 80048 BASIC METABOLIC PNL TOTAL CA: CPT

## 2019-10-31 PROCEDURE — 82550 ASSAY OF CK (CPK): CPT

## 2019-10-31 PROCEDURE — 83036 HEMOGLOBIN GLYCOSYLATED A1C: CPT

## 2019-10-31 PROCEDURE — 84484 ASSAY OF TROPONIN QUANT: CPT

## 2019-10-31 PROCEDURE — 36415 COLL VENOUS BLD VENIPUNCTURE: CPT

## 2019-10-31 PROCEDURE — 85730 THROMBOPLASTIN TIME PARTIAL: CPT

## 2019-10-31 PROCEDURE — 84443 ASSAY THYROID STIM HORMONE: CPT

## 2019-10-31 PROCEDURE — 85027 COMPLETE CBC AUTOMATED: CPT

## 2019-10-31 PROCEDURE — G0378: CPT

## 2019-10-31 PROCEDURE — 71045 X-RAY EXAM CHEST 1 VIEW: CPT

## 2020-09-09 NOTE — PATIENT PROFILE ADULT - BRADEN FRICTION AND SHEAR
Walked into room patient pulling at abdominal dressing because it was itchy. Cleaned the area and new dressing was placed. All sutures intact. Will continue to monitor. (3) no apparent problem

## 2020-12-02 PROBLEM — Z78.9 OTHER SPECIFIED HEALTH STATUS: Chronic | Status: ACTIVE | Noted: 2019-08-06

## 2020-12-04 ENCOUNTER — APPOINTMENT (OUTPATIENT)
Dept: GASTROENTEROLOGY | Facility: CLINIC | Age: 55
End: 2020-12-04
Payer: COMMERCIAL

## 2020-12-04 VITALS
SYSTOLIC BLOOD PRESSURE: 158 MMHG | TEMPERATURE: 97.1 F | HEIGHT: 69 IN | DIASTOLIC BLOOD PRESSURE: 88 MMHG | BODY MASS INDEX: 31.7 KG/M2 | OXYGEN SATURATION: 97 % | WEIGHT: 214 LBS | HEART RATE: 64 BPM

## 2020-12-04 DIAGNOSIS — Z83.3 FAMILY HISTORY OF DIABETES MELLITUS: ICD-10-CM

## 2020-12-04 DIAGNOSIS — Z80.42 FAMILY HISTORY OF MALIGNANT NEOPLASM OF PROSTATE: ICD-10-CM

## 2020-12-04 DIAGNOSIS — Z01.818 ENCOUNTER FOR OTHER PREPROCEDURAL EXAMINATION: ICD-10-CM

## 2020-12-04 DIAGNOSIS — Z86.39 PERSONAL HISTORY OF OTHER ENDOCRINE, NUTRITIONAL AND METABOLIC DISEASE: ICD-10-CM

## 2020-12-04 DIAGNOSIS — Z12.11 ENCOUNTER FOR SCREENING FOR MALIGNANT NEOPLASM OF COLON: ICD-10-CM

## 2020-12-04 DIAGNOSIS — F17.210 NICOTINE DEPENDENCE, CIGARETTES, UNCOMPLICATED: ICD-10-CM

## 2020-12-04 DIAGNOSIS — R07.89 OTHER CHEST PAIN: ICD-10-CM

## 2020-12-04 DIAGNOSIS — Z78.9 OTHER SPECIFIED HEALTH STATUS: ICD-10-CM

## 2020-12-04 PROBLEM — Z00.00 ENCOUNTER FOR PREVENTIVE HEALTH EXAMINATION: Status: ACTIVE | Noted: 2020-12-04

## 2020-12-04 PROCEDURE — 99072 ADDL SUPL MATRL&STAF TM PHE: CPT

## 2020-12-04 PROCEDURE — 99204 OFFICE O/P NEW MOD 45 MIN: CPT

## 2020-12-04 RX ORDER — SODIUM SULFATE, POTASSIUM SULFATE, MAGNESIUM SULFATE 17.5; 3.13; 1.6 G/ML; G/ML; G/ML
17.5-3.13-1.6 SOLUTION, CONCENTRATE ORAL
Qty: 1 | Refills: 0 | Status: ACTIVE | COMMUNITY
Start: 2020-12-04 | End: 1900-01-01

## 2020-12-04 RX ORDER — LORATADINE 10 MG/1
10 TABLET ORAL
Refills: 0 | Status: ACTIVE | COMMUNITY

## 2020-12-04 RX ORDER — C/B3/CR POLYNICOTN/OAT/PHYT/GU 75-12.5-5
TABLET ORAL
Refills: 0 | Status: ACTIVE | COMMUNITY

## 2020-12-04 NOTE — REVIEW OF SYSTEMS
[Feeling Tired] : feeling tired [Eyesight Problems] : eyesight problems [Nasal Discharge] : nasal discharge [Sore Throat] : sore throat [Chest Pain] : chest pain [Shortness Of Breath] : shortness of breath [Abdominal Pain] : abdominal pain [Vomiting] : vomiting [Heartburn] : heartburn [Nocturia] : nocturia [Arthralgias] : arthralgias [Joint Pain] : joint pain [Itching] : itching [Anxiety] : anxiety [Negative] : Heme/Lymph [de-identified] : donald

## 2020-12-04 NOTE — ASSESSMENT
[FreeTextEntry1] : Abdominal Pain: The patient complains of abdominal pain. The patient is to avoid nonsteroidal anti-inflammatory drugs and aspirin. I recommend a trial of Pantoprazole 40 mg once a day for 3 months for the symptoms.  \par Dyspepsia: The patient complains of dyspeptic symptoms.  The patient was advised to abide by an anti-gas diet.  The patient was given a pamphlet for anti-gas.  The patient and I reviewed the anti-gas diet at length. The patient is to start on a trial of Phazyme one tablet 3 times a day p.r.n. abdominal pain and gas.\par GERD: The patient was advised to avoid late-night meals and dietary indiscretions.  The patient was advised to avoid fried and fatty foods.  The patient was advised to abide by an anti-GERD diet. The patient was given a pamphlet for anti-GERD.  The patient and I reviewed the anti-GERD diet at length. I recommend a trial of Pantoprazole 40 mg once a day x 3 months for the symptoms.\par Nausea/Vomiting: The patient complains of nausea/vomiting. If the symptoms of nausea/vomiting persists, the patient may require a trial of Zofran 4 mg twice a day.\par Dysphagia: The patient complains of dysphagia of unclear etiology.   The dysphagia is worse with meals but not with liquids.   If the symptoms persist despite medications and if the upper endoscopy is unremarkable, the patient may require motility studies to assess the etiology of the dysphagia.  The patient agrees and will follow-up for further work-up and treatment.\par Atypical Chest Pain: The patient complains of atypical chest pain of unclear etiology.  The patient was advised to follow up with the PMD and cardiologist regarding evaluation for the atypical chest pain. The patient was told of possible etiologies such as cardiac, pulmonary, GI, musculoskeletal, stress and other causes for the atypical chest pain.  The patient agrees and will follow-up with the PMD and cardiologist. \par Prior Endoscopic Procedures: The patient had a prior upper endoscopyy performed by another gastroenterologist.  I will try to obtain the prior upper endoscopy reports.  The patient is to sign a record release to obtain those records.\par Upper Endoscopy: I recommend an upper endoscopy  to assess the symptoms for peptic ulcer disease versus esophagitis.  The patient was told of the risks and benefits of the procedure.  The patient was told of the risks of perforation, emergency surgery, bleeding, infections and missed lesions. The patient agreed and will schedule for procedure. The patient is to be n.p.o. after midnight.  The patient is to return for the procedure. \par Colon Cancer screening: I recommend colonoscopy for colon cancer screening over the age of 45 to assess for colonic polyps. The patient was told of the risks and benefits of the procedure.  The patient was told of the risks of perforation, emergency surgery, bleeding, infections and missed lesions. The patient agreed and will schedule for the procedure. The patient is to be n.p.o. after midnight and bowel prep was given.  The patient is to return for the procedure. \par Colonoscopy: I recommend a colonoscopy to assess the symptoms.  The patient was told of the risks and benefits of the procedure.  The patient was told of the risks of perforation, emergency surgery, bleeding, infections and missed lesions.  The patient agreed and will schedule for the procedure. . The patient is to be n.p.o. after midnight and bowel prep was given.  The patient is to return for the procedure. \par Follow-up: The patient is to follow-up in the office in 4 weeks to reassess the symptoms. The patient was told to call the office if any further problems. \par \par \par

## 2020-12-04 NOTE — HISTORY OF PRESENT ILLNESS
[None] : had no significant interval events [Diarrhea] : denies diarrhea [Constipation] : denies constipation [Yellow Skin Or Eyes (Jaundice)] : denies jaundice [Rectal Pain] : denies rectal pain [Wt Gain ___ Lbs] : recent [unfilled] ~Upound(s) weight gain [Heartburn] : heartburn [Nausea] : nausea [Vomiting] : vomiting [Abdominal Pain] : abdominal pain [Abdominal Swelling] : abdominal swelling [GERD] : gastroesophageal reflux disease [Wt Loss ___ Lbs] : no recent weight loss [Hiatus Hernia] : no hiatus hernia [Peptic Ulcer Disease] : no peptic ulcer disease [Pancreatitis] : no pancreatitis [Cholelithiasis] : no cholelithiasis [Kidney Stone] : no kidney stone [Inflammatory Bowel Disease] : no inflammatory bowel disease [Irritable Bowel Syndrome] : no irritable bowel syndrome [Diverticulitis] : no diverticulitis [Alcohol Abuse] : no alcohol abuse [Malignancy] : no malignancy [Abdominal Surgery] : no abdominal surgery [Appendectomy] : no appendectomy [Cholecystectomy] : no cholecystectomy [de-identified] : The blood work performed by his PMD October 7, 2020 revealed an elevated blood glucose of one 27 mg/dL, and an elevated total cholesterol/triglyceride level of 211/2 60 mg/dL, respectively. [de-identified] : The patient is a 55-year-old  male with past medical history significant for hypercholesterolemia who was referred to my office by Dr. Jesús Dye and Dr. Levy Soto for abdominal pain, dyspepsia, gastroesophageal reflux disease, dysphagia, atypical chest pain and nausea/vomiting. The patient also admits to coming to the office for colon cancer screening. I was asked to render an opinion for consultation for the above complaints.   The patient states that he is feeling uncomfortable x 2 weeks.  The patient complains of abdominal pain.  The patient describes the abdominal pain as a crampy, intermittent midepigastric and periumbilical discomfort that is nonradiating in nature.  The abdominal pain is unrelated to meals or passing gas or having bowel movements.  The abdominal pain is described as being mild to moderate in nature.  The abdominal pain occurs at night and in the morning.  The abdominal pain can occur at any time.   The abdominal pain never has awakened the patient from sleep.  The abdominal pain is not relieved with any medications.  The abdominal pain is associated with abdominal gas and bloating.  The patient complains of nausea and self-induced vomiting.  The patient complains of gastroesophageal reflux disease and dysphagia. The dysphagia occurs with both solids and liquids.  The gastroesophageal reflux disease is worse after meals and late at night and in the early morning. The gastroesophageal reflux disease is not improved with proton pump inhibitors, H2 blockers and antacids.  The patient complains of atypical chest pain and shortness of breath but denies any palpitations.  The patient admitted to having a recent cardiac evaluation in [place].  According to the patient, the cardiac evaluation was unremarkable.  The chest pain is described as a crampy, burning, intermittent substernal discomfort that is nonradiating in nature.  The patient admits to occasional episodes of diaphoresis.  The chest pain is described as being 8 out of 10 in intensity.  The chest pain can occur at any time.  The chest pain is worse at night and early morning.  The chest pain improves with passing gas.  The chest pain is unrelated to meals.  The chest pain never awakened the patient from sleep.  The patient denies any constipation or diarrhea.  The patient has 1 to 2 bowel movements a day.  The patient denies a change in bowel habits.  The patient denies a change in caliber of stool.  The patient denies having mucus discharge with the bowel movements.  The patient denies any bright red blood per rectum, melena or hematemesis. The patient denies any rectal pain or rectal pruritus. The patient denies any weight loss or anorexia.  The patient admits to gaining weight recently.  He denies any fevers or chills.  The patient denies any jaundice or pruritus.  The patient complains of chronic lower back pain.  The blood work performed by his PMD October 7, 2020 revealed an elevated blood glucose of one 27 mg/dL, and an elevated total cholesterol/triglyceride level of 211/2 60 mg/dL, respectively.  The patient admits to having a prior upper endoscopy performed by another gastroenterologist.  According to the patient, the upper endoscopic findings were unknown to the patient.  The patient admits to a family history of GI problems.  The patient’s mother had a history of peptic ulcer disease. [de-identified] : (+) smoking 1/2 PPD x 40 years, (-) ETOH, (-) IVDA\par

## 2020-12-06 LAB — HEMOCCULT STL QL: NEGATIVE

## 2020-12-11 RX ORDER — PANTOPRAZOLE 40 MG/1
40 TABLET, DELAYED RELEASE ORAL DAILY
Qty: 30 | Refills: 3 | Status: ACTIVE | COMMUNITY
Start: 2020-12-04

## 2021-01-01 DIAGNOSIS — Z01.818 ENCOUNTER FOR OTHER PREPROCEDURAL EXAMINATION: ICD-10-CM

## 2021-01-04 ENCOUNTER — APPOINTMENT (OUTPATIENT)
Dept: DISASTER EMERGENCY | Facility: CLINIC | Age: 56
End: 2021-01-04

## 2021-01-06 LAB — SARS-COV-2 N GENE NPH QL NAA+PROBE: NOT DETECTED

## 2021-01-07 ENCOUNTER — APPOINTMENT (OUTPATIENT)
Dept: GASTROENTEROLOGY | Facility: HOSPITAL | Age: 56
End: 2021-01-07

## 2021-01-07 ENCOUNTER — OUTPATIENT (OUTPATIENT)
Dept: OUTPATIENT SERVICES | Facility: HOSPITAL | Age: 56
LOS: 1 days | End: 2021-01-07
Payer: COMMERCIAL

## 2021-01-07 ENCOUNTER — RESULT REVIEW (OUTPATIENT)
Age: 56
End: 2021-01-07

## 2021-01-07 DIAGNOSIS — K21.9 GASTRO-ESOPHAGEAL REFLUX DISEASE WITHOUT ESOPHAGITIS: ICD-10-CM

## 2021-01-07 DIAGNOSIS — Z12.11 ENCOUNTER FOR SCREENING FOR MALIGNANT NEOPLASM OF COLON: ICD-10-CM

## 2021-01-07 DIAGNOSIS — R10.13 EPIGASTRIC PAIN: ICD-10-CM

## 2021-01-07 PROCEDURE — G0121 COLON CA SCRN NOT HI RSK IND: CPT

## 2021-01-07 PROCEDURE — 88312 SPECIAL STAINS GROUP 1: CPT

## 2021-01-07 PROCEDURE — 88305 TISSUE EXAM BY PATHOLOGIST: CPT

## 2021-01-07 PROCEDURE — G0121: CPT

## 2021-01-07 PROCEDURE — 88312 SPECIAL STAINS GROUP 1: CPT | Mod: 26

## 2021-01-07 PROCEDURE — 43239 EGD BIOPSY SINGLE/MULTIPLE: CPT

## 2021-01-07 PROCEDURE — 88305 TISSUE EXAM BY PATHOLOGIST: CPT | Mod: 26

## 2021-01-07 NOTE — CHART NOTE - NSCHARTNOTEFT_GEN_A_CORE
Colonoscopy Report:    Indication:          Abdominal pain, Alternating diarrhea/constipation, Lower GI bleeding, Anemia, Hx. of colonic polyps, Colon cancer screening  Referring MD:       Instrument:        #  Anesthesia:         MAC    Consent:  Informed consent was obtained from the patient after providing any opportunity for questions  Procedure: After placing the patient in the left lateral decubitus position, the colonoscope was gently inserted into the rectum and advanced to the terminal ileum and cecum. Color, texture, mucosa, and anatomy of the colon were carefully examined with the scope. The patient tolerated the procedure well. After completion of the exam, the patient was transferred to the recovery room.     Procedure: Colonoscopy    Preparation: Nulytely (Adequate Prep)    Findings:     Anal Canal:	              Normal appearing anal canal. (+) Small internal hemorrhoids noted on retroflex view.  Rectum:	                      Normal appearing rectal mucosa with no polyps, masses, diverticulosis, AVMs or proctitis noted.  Sigmoid Colon:  	      Normal appearing colonic mucosa with no polyps, masses, diverticulosis, AVMs or colitis noted.  Descending Colon:     Normal appearing colonic mucosa with no polyps, masses, diverticulosis, AVMs or colitis noted.  Splenic Flexure:	      Normal appearing colonic mucosa with no polyps, masses, diverticulosis, AVMs or colitis noted.  Transverse Colon:      Normal appearing colonic mucosa with no polyps, masses, diverticulosis, AVMs or colitis noted.  Hepatic Flexure:	      Normal appearing colonic mucosa with no polyps, masses, diverticulosis, AVMs or colitis noted.  Ascending Colon: 	      Normal appearing colonic mucosa with no polyps, masses, diverticulosis, AVMs or colitis noted.  Cecum:	                      Normal appearing colonic mucosa with no polyps, masses, diverticulosis, AVMs or colitis noted.  Ileo-cecal Valve:	      Normal appearing ileo-cecal valve mucosa with no polyps, masses, AVMs or ileo-colitis noted.  Ileum:                           Normal ileal mucosa with no polyps, masses, diverticulosis, AVMs or ileitis noted.  	    EBL:0    Impression:  1.       A/P:      Procedure Start Time:    Cecum Reached Time:   Procedure End Time:     Total Withdrawal Time:       Attending:       Nirmal Fitzgerald M.D. Colonoscopy Report:    Indication:          Colon cancer screening  Referring MD:    Dr. Jesús Dye, Dr. Levy Soto  Instrument:        # [id number]  Anesthesia:         MAC    Consent:  Informed consent was obtained from the patient after providing any opportunity for questions  Procedure: After placing the patient in the left lateral decubitus position, the colonoscope was gently inserted into the rectum and advanced to the cecum. Color, texture, mucosa, and anatomy of the colon were carefully examined with the scope. The patient tolerated the procedure well. After completion of the exam, the patient was transferred to the recovery room.     Procedure: Colonoscopy    Preparation: Nulytely (Adequate Prep)    Findings:     Anal Canal:	      Normal appearing anal canal. (+) Small internal hemorrhoids noted on retroflex view.  Rectum:	                    Normal appearing rectal mucosa with no polyps, masses, diverticulosis, AVMs or proctitis noted.  Sigmoid Colon:  	      Normal appearing colonic mucosa with no polyps, masses, diverticulosis, AVMs or colitis noted.  Descending Colon:       Normal appearing colonic mucosa with no polyps, masses, diverticulosis, AVMs or colitis noted.  Splenic Flexure:	      Normal appearing colonic mucosa with no polyps, masses, diverticulosis, AVMs or colitis noted.  Transverse Colon:        Normal appearing colonic mucosa with no polyps, masses, diverticulosis, AVMs or colitis noted.  Hepatic Flexure:	      Normal appearing colonic mucosa with no polyps, masses, diverticulosis, AVMs or colitis noted.  Ascending Colon: 	      Normal appearing colonic mucosa with no polyps, masses, diverticulosis, AVMs or colitis noted.  Cecum:	                    Normal appearing colonic mucosa with no polyps, masses, diverticulosis, AVMs or colitis noted.  Ileo-cecal Valve:	      Normal appearing ileo-cecal valve mucosa with no polyps, masses, AVMs or ileo-colitis noted.  Ileum:                          Not visualized.  	    EBL:0    Impression:  1. Normal colon 2. Small internal hemorrhoids    A/P:     1. Recommend a high fiber diet  2. Consider a trial of Anusol HC suppositories one per rectum q.h.s. and Anusol HC 2.5% cream applied to affected area twice a day BID hemorrhoidal bleeding or pain.  3. Recommend a repeat colonoscopy in 10 years to reassess for colonic polyps unless symptomatic.  4. Followup in the office in 4 weeks to reassess symptoms and discussed the findings.      Procedure Start Time:   11:00 am  Cecum Reached Time:   11:32 am  Procedure End Time:     11:40 am  Total Withdrawal Time:   8 Minutes      Attending:       Nirmal Fitzgerald M.D.

## 2021-01-07 NOTE — CHART NOTE - NSCHARTNOTEFT_GEN_A_CORE
Esophagogastroduodenoscopy Report:    Indication:             Abdominal pain, dyspepsia, GERD, atypical chest pain, nausea/vomiting,  Referring MD:          Instrument:  #  Anesthesia:            MAC    Consent:  Informed consent was obtained from the patient after providing any opportunity for questions  Procedure: The gastroscope was gently passed through the incisoral orifice into the oral cavity and under direct visualization the esophagus was intubated. The endoscope was passed down the esophagus, through the stomach and into proximal jejunum. Color, texture, mucosa and anatomy of the esophagus, stomach, and duodenum were carefully examined with the scope. The patient tolerated the procedure well. After completion of the examination, the patient was transferred to the recovery room.     Procedure: Upper endoscopy and biopsies    Preparation: NPO     Findings:     Oropharynx:	         Normal appearing oropharynx.  Esophagus:	         Normal appearing esophagus with no ulcers, erosions, erythema noted.  Biopsy taken.  EG-junction:	         Normal appearing E-G junction at 35 cm. No hiatal hernia noted. No ulcers, erosions or erythema noted.  Cardia:	                 Normal appearing gastric mucosa with no ulcers, erosions or erythema noted. (+) Clear liquid suctioned.  Body:	                 Normal appearing gastric mucosa with no ulcers, erosions or erythema noted.  Biopsy taken.  Antrum:	                 Normal appearing gastric mucosa with no ulcers, erosions or erythema noted.  Biopsy taken.  Pylorus:	                 Normal appearing pylorus and pyloric channel with no ulcers, erosions or erythema noted.     Duodenal Bulb:      Normal appearing duodenal mucosa with no ulcers, erosions or erythema noted. Biopsy taken.  2nd portion:	         Normal appearing duodenal mucosa with no ulcers, erosions or erythema noted.  Biopsy taken.  3rd portion:	      Not visualized.      EBL:0    Impression: Small hiatal hernia, Mild diffuse bile gastritis    Plan:       Procedure Start Time:   Procedure End Time:         Attending:       Nirmal Fitzgerald M.D. Esophagogastroduodenoscopy Report:    Indication:             Abdominal pain, dyspepsia, GERD, atypical chest pain, nausea/vomiting,  Referring MD:       Dr. Jesús Dye, Dr. Levy Soto  Instrument:  #        [id number]  Anesthesia:            MAC    Consent:  Informed consent was obtained from the patient after providing any opportunity for questions  Procedure: The gastroscope was gently passed through the incisoral orifice into the oral cavity and under direct visualization the esophagus was intubated. The endoscope was passed down the esophagus, through the stomach and into proximal jejunum. Color, texture, mucosa and anatomy of the esophagus, stomach, and duodenum were carefully examined with the scope. The patient tolerated the procedure well. After completion of the examination, the patient was transferred to the recovery room.     Procedure: Upper endoscopy and biopsies    Preparation: NPO     Findings:     Oropharynx:	   Normal appearing oropharynx.  Esophagus:	   Normal appearing esophagus with no ulcers, erosions, erythema noted.  Biopsy taken.  EG-junction:	   Normal appearing E-G junction at 38 cm. (+) Small hiatal hernia noted. No ulcers, erosions or erythema noted.  Cardia:	                 Mild diffuse erythema suggestive of gastritis but no ulcers or erosions noted.  (+) Bile suctioned.  Body:	                 Mild diffuse erythema suggestive of gastritis but no ulcers or erosions noted. Biopsy taken.  Antrum:	                 Mild diffuse erythema suggestive of gastritis but no ulcers or erosions noted. Biopsy taken.  Pylorus:	                 Normal appearing pylorus and pyloric channel with no ulcers, erosions or erythema noted.     Duodenal Bulb:         Normal appearing duodenal mucosa with no ulcers, erosions or erythema noted. Biopsy taken.  2nd portion:	   Normal appearing duodenal mucosa with no ulcers, erosions or erythema noted.  Biopsy taken.  3rd portion:	   Not visualized.      EBL:0    Impression: 1. Small hiatal hernia 2. Mild diffuse bile gastritis    Plan:    1. Avoid late-night meals and dietary indiscretions.  2. Avoid fried and fatty foods.  3. Avoid nonsteroidal anti-inflammatory drugs and aspirin.  4. Will check path results.  5. Recommend a trial of pantoprazole 40 mg once a day for 3 months.  6. Followup in office in 4 weeks to reassess the symptoms and discuss the findings.      Procedure Start Time:   11:19 am  Procedure End Time:     11:26 am      Attending:       Nirmal Fitzgerald M.D.

## 2021-01-12 LAB — SURGICAL PATHOLOGY STUDY: SIGNIFICANT CHANGE UP

## 2021-02-12 ENCOUNTER — APPOINTMENT (OUTPATIENT)
Dept: GASTROENTEROLOGY | Facility: CLINIC | Age: 56
End: 2021-02-12
Payer: COMMERCIAL

## 2021-02-12 VITALS
BODY MASS INDEX: 31.4 KG/M2 | HEART RATE: 85 BPM | DIASTOLIC BLOOD PRESSURE: 82 MMHG | WEIGHT: 212 LBS | TEMPERATURE: 96.5 F | HEIGHT: 69 IN | SYSTOLIC BLOOD PRESSURE: 135 MMHG | OXYGEN SATURATION: 97 %

## 2021-02-12 DIAGNOSIS — K64.8 OTHER HEMORRHOIDS: ICD-10-CM

## 2021-02-12 DIAGNOSIS — K44.9 DIAPHRAGMATIC HERNIA W/OUT OBSTRUCTION OR GANGRENE: ICD-10-CM

## 2021-02-12 DIAGNOSIS — K29.30 CHRONIC SUPERFICIAL GASTRITIS W/OUT BLEEDING: ICD-10-CM

## 2021-02-12 PROCEDURE — 99072 ADDL SUPL MATRL&STAF TM PHE: CPT

## 2021-02-12 PROCEDURE — 99213 OFFICE O/P EST LOW 20 MIN: CPT

## 2021-02-12 RX ORDER — HYDROCORTISONE 25 MG/G
2.5 CREAM TOPICAL
Qty: 2 | Refills: 3 | Status: ACTIVE | COMMUNITY
Start: 2021-02-12 | End: 1900-01-01

## 2021-02-12 RX ORDER — CLARITHROMYCIN 500 MG/1
500 TABLET, FILM COATED ORAL TWICE DAILY
Qty: 28 | Refills: 0 | Status: ACTIVE | COMMUNITY
Start: 2021-02-12 | End: 1900-01-01

## 2021-02-12 RX ORDER — OMEPRAZOLE 40 MG/1
40 CAPSULE, DELAYED RELEASE ORAL TWICE DAILY
Qty: 28 | Refills: 0 | Status: ACTIVE | COMMUNITY
Start: 2021-02-12 | End: 1900-01-01

## 2021-02-12 RX ORDER — HYDROCORTISONE ACETATE 25 MG/1
25 SUPPOSITORY RECTAL
Qty: 30 | Refills: 3 | Status: ACTIVE | COMMUNITY
Start: 2021-02-12 | End: 1900-01-01

## 2021-02-12 RX ORDER — AMOXICILLIN 500 MG/1
500 CAPSULE ORAL TWICE DAILY
Qty: 56 | Refills: 0 | Status: ACTIVE | COMMUNITY
Start: 2021-02-12 | End: 1900-01-01

## 2021-02-12 NOTE — HISTORY OF PRESENT ILLNESS
[None] : had no significant interval events [Nausea] : denies nausea [Vomiting] : denies vomiting [Diarrhea] : denies diarrhea [Constipation] : denies constipation [Yellow Skin Or Eyes (Jaundice)] : denies jaundice [Abdominal Pain] : denies abdominal pain [Rectal Pain] : denies rectal pain [Heartburn] : heartburn [Abdominal Swelling] : abdominal swelling [GERD] : gastroesophageal reflux disease [Hiatus Hernia] : hiatus hernia [Wt Gain ___ Lbs] : no recent weight gain [Wt Loss ___ Lbs] : no recent weight loss [Peptic Ulcer Disease] : no peptic ulcer disease [Pancreatitis] : no pancreatitis [Cholelithiasis] : no cholelithiasis [Kidney Stone] : no kidney stone [Inflammatory Bowel Disease] : no inflammatory bowel disease [Irritable Bowel Syndrome] : no irritable bowel syndrome [Diverticulitis] : no diverticulitis [Malignancy] : no malignancy [Alcohol Abuse] : no alcohol abuse [Abdominal Surgery] : no abdominal surgery [Appendectomy] : no appendectomy [Cholecystectomy] : no cholecystectomy [de-identified] : The patient states that he is feeling uncomfortable. The patient denies any jaundice or pruritus.  The patient complains of occasional lower back pain. The patient denies any abdominal pain.  The patient complains of abdominal gas and bloating.  The patient denies any nausea or vomiting.  The patient complains of gastroesophageal reflux disease and occasional dysphagia.  The dysphagia is worse with solids but unrelated to liquids.   The patient denies taking medications for the gastroesophageal reflux disease.  The gastroesophageal reflux disease is worse after meals and late at night and in the early morning. The patient complains of atypical chest pain, shortness of breath and palpitations.  The chest pain is described as a sharp, intermittent left sided chest discomfort that is nonradiating in nature.  The patient admits to occasional episodes of diaphoresis.  The chest pain is described as being 7 out of 10 in intensity.  The chest pain can occur at any time.  The chest pain is worse at night and early morning.  The chest pain improves with passing gas.  The chest pain is unrelated to meals.  The chest pain has never awakened the patient from sleep.  The patient denies any constipation or diarrhea.  The patient has 2 bowel movements a day.  The patient denies a change in bowel habits.  The patient denies a change in caliber of stool.  The patient denies having mucus discharge with the bowel movements.  The patient denies any bright red blood per rectum, melena or hematemesis.  The patient denies any rectal pain or rectal pruritus.  The patient denies any weight loss or anorexia.  He denies any fevers or chills.  The patient had an upper endoscopy and colonoscopy to the cecum performed at the [facility] GI endoscopy suite on January 7, 2021. The upper endoscopy was performed up to the level of the second portion of the duodenum. The upper endoscopy revealed a small hiatal hernia and mild diffuse bile gastritis. Biopsies were taken of the distal esophagus, antrum, body of stomach and duodenum to assess for esophagitis, gastritis and duodenitis. The pathology revealed distal esophagus with unremarkable squamous esophageal epithelium that was negative for fungal microorganisms, gastric antral and body mucosa with moderate chronic active gastritis that was positive for Helicobacter pylori and unremarkable small intestinal duodenal mucosa with preserved villous architecture with no evidence of increased intraepithelial lymphocytes, celiac disease, or parasites. The colonoscopy to the cecum revealed a normal colon and small internal hemorrhoids. There were no polyps, masses, diverticulosis, AVMs or colitis noted. The patient tolerated the procedures well.  The blood work performed by his PMD October 7, 2020 revealed an elevated blood glucose of 127 mg/dL, and an elevated total cholesterol/triglyceride level of 211/260 mg/dL, respectively. The patient admits to having a prior upper endoscopy performed by another gastroenterologist. According to the patient, the upper endoscopic findings were unknown to the patient. The patient admits to a family history of GI problems. The patient’s mother had a history of peptic ulcer disease.  [de-identified] : dysphagia [de-identified] : (+) smoking 1/3 PPD x 38 years, (-) ETOH, (-) IVDA\par

## 2021-02-12 NOTE — ASSESSMENT
[FreeTextEntry1] : Dyspepsia: The patient complains of dyspeptic symptoms.  The patient was advised to abide by an anti-gas diet.  The patient was given a pamphlet for anti-gas.  The patient and I reviewed the anti-gas diet at length. The patient is to start on a trial of Phazyme one tablet 3 times a day p.r.n. abdominal pain and gas.\par GERD: The patient was advised to avoid late-night meals and dietary indiscretions.  The patient was advised to avoid fried and fatty foods.  The patient was advised to abide by an anti-GERD diet. The patient was given a pamphlet for anti-GERD.  The patient and I reviewed the anti-GERD diet at length. I recommend a trial of Pantoprazole 40 mg once a day x 3 months for the symptoms.\par Dysphagia: The patient complains of dysphagia of unclear etiology.   The dysphagia is worse with meals but not with liquids.   If the symptoms persist despite medications and if the upper endoscopy is unremarkable, the patient may require swallowing  studies versus motility studies to assess the etiology of the dysphagia.  The patient agrees and will follow-up for further work-up and treatment.\par Hiatal Hernia:  The patient was advised to avoid late-night meals and dietary indiscretions.  The patient was advised to avoid fried and fatty foods.  The patient was advised to abide by an anti-GERD diet. The patient was given a pamphlet for anti-GERD.  The patient and I reviewed the anti-GERD diet at length. I recommend a trial of Pantoprazole 40 mg once a day for 3 months for the symptoms.\par Gastritis: The patient has a history of gastritis. The patient is to avoid nonsteroidal anti-inflammatory drugs and aspirin. I recommend a trial of pantoprazole 40 mg once a day for 3 months for the symptoms. \par H. pylori Gastritis: The patient was found to have H. pylori gastritis on recent upper endoscopy.  I recommend a trial of Clarithromycin 500 mg 2 times a day, Amoxicilin 500mg twice a day and Omeprazole 40 mg twice a day for 14 days for H. pylori eradication.  The patient is to return in 2 months for H. pylori breath test to assess for eradication of the bacteria. \par Internal Hemorrhoids: The patient is to start on a trial of Anusol H. C. suppositories one per rectum nightly and Anusol HC2 .5% cream apply to affected area twice a day p.r.n. hemorrhoidal bleeding or pain. \par I recommend a repeat colonoscopy in 10 years to reassess for colonic polyps pending patient’s health unless symptomatic.  The patient agreed and will follow up for the procedure. \par Prior Endoscopic Procedures: The patient had a prior upper endoscopy performed by another gastroenterologist. I will try to obtain the prior upper endoscopy reports. The patient is to sign a record release to obtain those records.\par Follow-up: The patient is to follow-up in the office in 2 months to reassess the symptoms. The patient was told to call the office if any further problems. \par \par \par \par

## 2021-04-12 ENCOUNTER — APPOINTMENT (OUTPATIENT)
Dept: GASTROENTEROLOGY | Facility: CLINIC | Age: 56
End: 2021-04-12
Payer: COMMERCIAL

## 2021-04-12 VITALS
HEART RATE: 70 BPM | DIASTOLIC BLOOD PRESSURE: 81 MMHG | WEIGHT: 211 LBS | OXYGEN SATURATION: 97 % | SYSTOLIC BLOOD PRESSURE: 129 MMHG | BODY MASS INDEX: 31.25 KG/M2 | TEMPERATURE: 97.2 F | HEIGHT: 69 IN

## 2021-04-12 DIAGNOSIS — R10.13 EPIGASTRIC PAIN: ICD-10-CM

## 2021-04-12 DIAGNOSIS — K21.9 GASTRO-ESOPHAGEAL REFLUX DISEASE W/OUT ESOPHAGITIS: ICD-10-CM

## 2021-04-12 DIAGNOSIS — A04.8 OTHER SPECIFIED BACTERIAL INTESTINAL INFECTIONS: ICD-10-CM

## 2021-04-12 DIAGNOSIS — R13.10 DYSPHAGIA, UNSPECIFIED: ICD-10-CM

## 2021-04-12 PROCEDURE — 99072 ADDL SUPL MATRL&STAF TM PHE: CPT

## 2021-04-12 PROCEDURE — 99214 OFFICE O/P EST MOD 30 MIN: CPT

## 2021-04-12 RX ORDER — SUCRALFATE 1 G/10ML
1 SUSPENSION ORAL
Qty: 3600 | Refills: 3 | Status: ACTIVE | COMMUNITY
Start: 2021-04-12 | End: 1900-01-01

## 2021-04-12 RX ORDER — SIMETHICONE 180 MG
180 CAPSULE ORAL 4 TIMES DAILY
Qty: 120 | Refills: 3 | Status: ACTIVE | COMMUNITY
Start: 2021-04-12 | End: 1900-01-01

## 2021-04-12 RX ORDER — DICYCLOMINE HYDROCHLORIDE 10 MG/1
10 CAPSULE ORAL 3 TIMES DAILY
Qty: 90 | Refills: 3 | Status: ACTIVE | COMMUNITY
Start: 2021-04-12 | End: 1900-01-01

## 2021-04-12 NOTE — ASSESSMENT
[FreeTextEntry1] : Abdominal Pain: The patient complains of abdominal pain. The patient is to avoid nonsteroidal anti-inflammatory drugs and aspirin.  I recommend a trial of Carafate suspension 1 gram/10 cc PO 4 times a day for 3 months for the symptoms.   I recommend a low FOD-MAP diet.  I recommend a trial of Dicyclomine 10 mg tablet PO 3 times a day PRN for the abdominal pain.\par Dyspepsia: The patient complains of dyspeptic symptoms.  The patient was advised to abide by an anti-gas diet.  The patient was given a pamphlet for anti-gas.  The patient and I reviewed the anti-gas diet at length. The patient is to start on a trial of Phazyme one tablet 3 times a day p.r.n. abdominal pain and gas.\par GERD: The patient was advised to avoid late-night meals and dietary indiscretions.  The patient was advised to avoid fried and fatty foods.  The patient was advised to abide by an anti-GERD diet. The patient was given a pamphlet for anti-GERD.  The patient and I reviewed the anti-GERD diet at length. I recommend a trial of Carafate suspension 1 gram/10 cc 4 times a day x 3 months for the symptoms.\par Dysphagia: The patient complains of dysphagia of unclear etiology.   The dysphagia is worse with meals but not with liquids.   If the symptoms persist despite medications and if the upper endoscopy is unremarkable, the patient may require motility studies to assess the etiology of the dysphagia.  The patient agrees and will follow-up for further work-up and treatment.\par Hiatal Hernia: The patient was advised to avoid late-night meals and dietary indiscretions. The patient was advised to avoid fried and fatty foods. The patient was advised to abide by an anti-GERD diet. The patient was given a pamphlet for anti-GERD. The patient and I reviewed the anti-GERD diet at length.I recommend a trial of Carafate suspension 1 gram/10 cc PO 4 times a day for 3 months for the symptoms.\par Gastritis: The patient has a history of gastritis. The patient is to avoid nonsteroidal anti-inflammatory drugs and aspirin. I recommend a trial of Carafate suspension 1 gram/10 cc PO 4 times a day for 3 months for the symptoms.\par H. pylori Gastritis: The patient was found to have H. pylori gastritis on recent upper endoscopy. The patient  completed a trial of Clarithromycin 500 mg 2 times a day, Amoxicilin 500mg twice a day and Omeprazole 40 mg twice a day for 14 days for H. pylori eradication. I recommend an H. pylori breath test to assess for eradication of the bacteria. \par Internal Hemorrhoids: The patient is to consider a trial of Anusol H. C. suppositories one per rectum nightly and Anusol HC2.5% cream apply to affected area twice a day p.r.n. hemorrhoidal bleeding or pain. \par I recommend a repeat colonoscopy in 10 years to reassess for colonic polyps pending patient’s health unless symptomatic. The patient agreed and will follow up for the procedure. \par Prior Endoscopic Procedures: The patient had a prior upper endoscopy performed by another gastroenterologist. I will try to obtain the prior upper endoscopy reports. The patient is to sign a record release to obtain those records.\par I recommend followup with PMD regarding cardiac evaluation for murmur. and chest pain.\par Follow-up: The patient is to follow-up in the office in 3 months to reassess the symptoms. The patient was told to call the office if any further problems. \par \par \par

## 2021-04-12 NOTE — HISTORY OF PRESENT ILLNESS
[None] : had no significant interval events [Nausea] : denies nausea [Vomiting] : denies vomiting [Diarrhea] : denies diarrhea [Constipation] : denies constipation [Yellow Skin Or Eyes (Jaundice)] : denies jaundice [Rectal Pain] : denies rectal pain [Heartburn] : heartburn [Abdominal Pain] : abdominal pain [Abdominal Swelling] : abdominal swelling [GERD] : gastroesophageal reflux disease [Hiatus Hernia] : hiatus hernia [Kidney Stone] : kidney stone [Wt Gain ___ Lbs] : no recent weight gain [Wt Loss ___ Lbs] : no recent weight loss [Peptic Ulcer Disease] : no peptic ulcer disease [Pancreatitis] : no pancreatitis [Cholelithiasis] : no cholelithiasis [Inflammatory Bowel Disease] : no inflammatory bowel disease [Irritable Bowel Syndrome] : no irritable bowel syndrome [Diverticulitis] : no diverticulitis [Alcohol Abuse] : no alcohol abuse [Malignancy] : no malignancy [Abdominal Surgery] : no abdominal surgery [Appendectomy] : no appendectomy [Cholecystectomy] : no cholecystectomy [de-identified] : The patient states that he is feeling uncomfortable x 3 to 4 weeks. The patient denies any jaundice or pruritus.  The patient complains of occasional lower back pain. The patient complains of occasional abdominal pain.  The patient describes the abdominal pain as a crampy, intermittent midepigastric and right flank discomfort that is nonradiating in nature.  The abdominal pain is worse with stress.  The abdominal pain is worse with meals and improves with passing gas or having a bowel movement.  The abdominal pain is described as being mild to moderate in nature.  The abdominal pain occurs at night and in the morning.  The abdominal pain can occur at any time.   The abdominal pain has never awakened the patient from sleep.  The abdominal pain is slightly relieved with certain medication such as proton pump inhibitors, H2 blockers and antacids.  The abdominal pain is associated with abdominal gas and bloating.  The patient denies any nausea or vomiting.  The patient complains of gastroesophageal reflux disease and dysphagia.  The dysphagia occurs with both solids and liquids.  The patient denies taking medications for the gastroesophageal reflux disease.  The gastroesophageal reflux disease is worse after meals and late at night and in the early morning. The gastroesophageal reflux disease is slightly improved with proton pump inhibitors, H2 blockers and antacids.   The patient complains of atypical chest pain but denies any shortness of breath or palpitations. The patient admits to a cardiac evaluation approximately 2 years ago in [place].    The chest pain is described as a pressure, intermittent substernal discomfort that is nonradiating in nature.  The patient admits to occasional episodes of diaphoresis.  The chest pain is described as being 8 to 9 out of 10 in intensity.  The chest pain can occur at any time.  The chest pain is worse at night and early morning.  The chest pain improves with passing gas.  The chest pain is unrelated to meals.  The chest pain has never awakened the patient from sleep.  The patient denies any constipation or diarrhea.  The patient has 1 to 2 bowel movements a day.  The patient denies a change in bowel habits.  The patient denies a change in caliber of stool.  The patient denies having mucus discharge with the bowel movements.  The patient denies any bright red blood per rectum, melena or hematemesis.  The patient denies any rectal pain or rectal pruritus.  The patient denies any weight loss or anorexia.  He denies any fevers or chills.  The patient had an upper endoscopy and colonoscopy to the cecum performed at [place] [facility] [place] on January 7, 2021. The upper endoscopy was performed up to the level of the second portion of the duodenum. The upper endoscopy revealed a small hiatal hernia and mild diffuse bile gastritis. Biopsies were taken of the distal esophagus, antrum, body of stomach and duodenum to assess for esophagitis, gastritis and duodenitis. The pathology revealed distal esophagus with unremarkable squamous esophageal epithelium that was negative for fungal microorganisms, gastric antral and body mucosa with moderate chronic active gastritis that was positive for Helicobacter pylori and unremarkable small intestinal duodenal mucosa with preserved villous architecture with no evidence of increased intraepithelial lymphocytes, celiac disease, or parasites. The patient completed a trial of Clarithromycin 500 mg 2 times a day, Amoxicilin 500mg twice a day and Omeprazole 40 mg twice a day for 14 days for H. pylori eradication.  The colonoscopy to the cecum revealed a normal colon and small internal hemorrhoids. There were no polyps, masses, diverticulosis, AVMs or colitis noted. The patient tolerated the procedures well. The blood work performed by his PMD October 7, 2020 revealed an elevated blood glucose of 127 mg/dL, and an elevated total cholesterol/triglyceride level of 211/260 mg/dL, respectively. The patient admits to having a prior upper endoscopy performed by another gastroenterologist. According to the patient, the upper endoscopic findings were unknown to the patient. The patient admits to a family history of GI problems. The patient’s mother had a history of peptic ulcer disease.  [de-identified] : (+) smoking < 1/2 PPD x 38 years, (-) ETOH, (-) IVDA\par

## 2021-07-09 ENCOUNTER — APPOINTMENT (OUTPATIENT)
Dept: GASTROENTEROLOGY | Facility: CLINIC | Age: 56
End: 2021-07-09

## 2022-01-01 NOTE — EEG REPORT - NS EEG TEXT BOX
[facility]  Report of Routine EEG     [facility]: [address], [address] [place] [address], Phone: [phone]  [facility]: [address], [place] [address], Phone: [phone]  Office: [address], [place] [address], Phone: [phone]    Patient Name: MARCELINO NOE    Age: 53 year  : 1965  Patient ID: -, MRN #: [id number], Location: [address]  Referring Physician: DR MARRUFO  EEG #:     Study Date: 2019		    Technical Information:					  On Instrument: -  Placement and Labeling of Electrodes:  The EEG was performed utilizing 20 channels referential EEG connections (coronal over temporal over parasagittal montage) using all standard 10-20 electrode placements with EKG.  Recording was at a sampling rate of 256 samples per second per channel.  Vladislav and seizure detection algorithms were utilized.    History:  Routine study..Performed bedside  Patient awake and alert  52 y/o male presents with syncope and collapse  R/O seizures ..multiple episodes od syncope for pat 24 yeears  Slowly lays down syncopizes  Does not recall episodes      Medication	  No AEDs.	    Study Interpretation:    Findings: The background was continuous, spontaneously variable and reactive. During wakefulness, the posterior dominant rhythm consisted of symmetric, well-modulated 8.5 Hz activity, with amplitude to 30 uV, that attenuated to eye opening.  Low amplitude frontal beta was noted in wakefulness.    Background Slowing:  No generalized background slowing was present.    Focal Slowing:   None were present.    Sleep Background:  Drowsiness was characterized by fragmentation, attenuation, and slowing of the background activity.    Sleep was characterized by the presence of symmetric, rudimentary spindles and K-complexes.    Other Non-Epileptiform Findings:  None were present.    Interictal Epileptiform Activity:   None were present.    Events:  Clinical events: None recorded.  Seizures: None recorded.    Activation Procedures:   Hyperventilation was not performed.    Photic stimulation was performed and did not elicit any abnormalities.      Artifacts:  Intermittent myogenic and movement artifacts were noted. Sweat artifact were also noted during this study.    ECG:  The heart rate on single channel ECG was predominantly between 50 and 60 BPM.    EEG Summary/Classification:  Normal EEG in the awake, drowsy and asleep states.    EEG Impression/Clinical Correlate:    Normal EEG study.    No epileptic pattern or seizure seen.    No clinical events concerning for seizure were captured during this study.    ________________________________________    Viet Marrufo MD  Attending Physician, [facility] [place]

## 2022-03-09 NOTE — H&P ADULT - RS GEN PE MLT RESP DETAILS PC
airway patent/no rales/no rhonchi/clear to auscultation bilaterally/no wheezes [Coronary Artery Disease] : coronary artery disease [Follow-Up - Clinic] : a clinic follow-up of [FreeTextEntry2] : fatigue